# Patient Record
Sex: FEMALE | Race: WHITE | HISPANIC OR LATINO | Employment: FULL TIME | URBAN - METROPOLITAN AREA
[De-identification: names, ages, dates, MRNs, and addresses within clinical notes are randomized per-mention and may not be internally consistent; named-entity substitution may affect disease eponyms.]

---

## 2017-03-30 ENCOUNTER — HOSPITAL ENCOUNTER (EMERGENCY)
Facility: HOSPITAL | Age: 32
Discharge: HOME | End: 2017-03-30
Attending: EMERGENCY MEDICINE
Payer: MEDICAID

## 2017-03-30 VITALS
WEIGHT: 170 LBS | HEIGHT: 59 IN | SYSTOLIC BLOOD PRESSURE: 134 MMHG | DIASTOLIC BLOOD PRESSURE: 78 MMHG | OXYGEN SATURATION: 99 % | TEMPERATURE: 98 F | RESPIRATION RATE: 18 BRPM | HEART RATE: 86 BPM | BODY MASS INDEX: 34.27 KG/M2

## 2017-03-30 DIAGNOSIS — R05.9 COUGH: ICD-10-CM

## 2017-03-30 DIAGNOSIS — J20.9 ACUTE BRONCHITIS, UNSPECIFIED ORGANISM: Primary | ICD-10-CM

## 2017-03-30 LAB
B-HCG UR QL: NEGATIVE
CTP QC/QA: YES
FLUAV AG SPEC QL IA: NEGATIVE
FLUBV AG SPEC QL IA: NEGATIVE
SPECIMEN SOURCE: NORMAL

## 2017-03-30 PROCEDURE — 99284 EMERGENCY DEPT VISIT MOD MDM: CPT | Mod: 25

## 2017-03-30 PROCEDURE — 81025 URINE PREGNANCY TEST: CPT

## 2017-03-30 PROCEDURE — 25000003 PHARM REV CODE 250: Performed by: NURSE PRACTITIONER

## 2017-03-30 PROCEDURE — 87400 INFLUENZA A/B EACH AG IA: CPT

## 2017-03-30 RX ORDER — PROMETHAZINE HYDROCHLORIDE AND CODEINE PHOSPHATE 6.25; 1 MG/5ML; MG/5ML
5 SOLUTION ORAL EVERY 4 HOURS PRN
Qty: 118 ML | Refills: 0 | Status: SHIPPED | OUTPATIENT
Start: 2017-03-30 | End: 2017-04-09

## 2017-03-30 RX ORDER — CETIRIZINE HYDROCHLORIDE 10 MG/1
10 TABLET ORAL DAILY
Qty: 30 TABLET | Refills: 0 | OUTPATIENT
Start: 2017-03-30 | End: 2021-10-29

## 2017-03-30 RX ORDER — IBUPROFEN 600 MG/1
600 TABLET ORAL
Status: COMPLETED | OUTPATIENT
Start: 2017-03-30 | End: 2017-03-30

## 2017-03-30 RX ADMIN — IBUPROFEN 600 MG: 600 TABLET ORAL at 10:03

## 2017-03-30 NOTE — DISCHARGE INSTRUCTIONS
Bronquitis Viral [Bronchitis, Viral, Adult No Abx]    Usted tiene laura bronquitis viral (viral bronchitis). Esta enfermedad es contagiosa emmett los primeros días y se transmite por el aire, por la tos o el estornudo de la persona afectada, o por contacto directo con itzel persona (si shar toca a la persona enferma y después se toca los ojos, la nariz o la boca).  La mayoría de las enfermedades virales mejoran en 10-14 días con reposo y simples gretchen caseros; aunque, en ocasiones, la enfermedad puede durar varias semanas. Los antibióticos (antibiotics) son ineficaces contra los virus, por lo que generalmente no se recetan para esta enfermedad.  Cuidados En [place]:  1. Si los síntomas son severos, descanse en dixon casa emmett los primeros 2 ó 3 días. Cuando reanude abhishek actividades, evite cansarse demasiado.  2. No fume y evite exponerse al humo de los demás.  3. Puede usar acetaminofén (acetaminophen) [Tylenol] o ibuprofeno (ibuprofen) [Motrin o Advil] para controlar la fiebre o el dolor, a menos que le hayan recetado otro calmante (analgésico) [pain medicine]. [NOTA: Si tiene laura enfermedad hepática o renal crónica (chronic liver or kidney disease), o ha tenido alguna vez laura úlcera estomacal (stomach ulcer) o sangrado gastrointestinal (GI bleeding), consulte con dixon médico antes de soy estos medicamentos.] (La aspirina [aspirin] no debe usarse nunca en personas menores de 18 años enfermas con fiebre, ya que puede causar daños graves al hígado.)  4. Es posible que tenga poco apetito, por lo que laura dieta ligera es adecuada. Para evitar la deshidratación, delaney entre 6 y 8 vasos de líquido al día (agua, bebidas deportivas denae cherie Gatorade, jugos de fruta, té, sopa etc.). La abundancia de líquido ayuda a desprender las secreciones de la nariz y los pulmones.  5. Los medicamentos sin receta para el resfriado no acortarán la duración de la enfermedad, jocelyn pueden ser útiles para aliviar la tos (Robitussin DM), el  dolor de garganta (Chloraseptic en comprimidos o spray); la congestión nasal y de los senos paranasales (Actifed o Sudafed). [NOTA: No use descongestionantes si tiene la presión arterial luigi.]  Programe laura VISITA DE CONTROL con dixon médico, o según le indique nuestro personal médico, si no empieza a sentirse mejor emmett la semana próxima.  NOTA: Si usted tiene 65 años o más, o si tiene asma crónica (chronic asthma) o enfermedad pulmonar obstructiva crónica (COPD), recomendamos laura VACUNA NEUMOCÓCICA (PNEUMOCOCCAL VACCINATION) cada laura años y laura VACUNA CONTRA LA GRIPE (INFLUENZA VACCINATION [FLU-SHOT]) cada otoño. Hable con dixon médico sobre esto. Si le crump hecho laura radiografía (X-ray), ésta será evaluada por un especialista. Le informarán de los nuevos hallazgos que puedan afectar la atención médica que necesita.]  Busque Prontamente Atención Médica  si algo de lo siguiente ocurre:  · Fiebre superior a los 100.4°F (38.0°C) emmett más de álvaro días.  · Dificultad para respirar, silbidos o dolor al respirar.  · Tos con expulsión de giles o aumento en la cantidad de esputo de color.  · Debilidad, somnolencia, dolor de mandeep, dolor en la ino o en los oídos, o rigidez en el nabil.  Date Last Reviewed: 9/13/2015  © 2555-2835 [employer], [employer]. [address] [place] [address], [place] [address]. Todos los derechos reservados. Esta información no pretende sustituir la atención médica profesional. Sólo dixon médico puede diagnosticar y tratar un problema de richie.

## 2017-03-30 NOTE — ED NOTES
Pt co cough with nasal congestion that started Sunday, pt denies n/v/d, denies chest pain or sob, pt awake alert in no acute distress, breath sounds all fields clear posterior, abd soft non tender to palpate, denies dysuria or hematuria

## 2017-03-30 NOTE — ED AVS SNAPSHOT
[facility]  [address]  [place] [address]               Sparkle Augustine   3/30/2017 10:23 AM   ED    Descripción:  Female : 1985   Departamento:  [facility]           Solorzano Cuidado fue coordinado por:     Provider Role From To    Howie Hunt MD Attending Provider 17 [phone] --    YAYO Yanez Nurse Practitioner 17 1028 --      Razón de la edu     Cough           Diagnósticos de Esta Visita        Comentarios    Acute bronchitis, unspecified organism    -  Primario     Cough           ED Disposition     ED Disposition Condition Comment    Discharge             Lista de tareas           Información de seguimiento     Realice un seguimiento con:  [facility]    Cómo:  Scottie laura edu lo antes posible    Cuándo:  2017    Especialidad:  Family Medicine    Información de contacto:    [address] [facility] [address]  [facility] [address] [phone]      [facility]     [facility] L[facility]nea de Enfermeras - Asistencia   Enfermeras registradas de [facility] pueden ayudarle a reservar laura edu, proveer educación para la richie, asesoría clínica, y otros servicios de asesoramiento.   Llame para davi servicio gratuito a [phone].             Medicamentos           These medications were administered today        Dose Freq    ibuprofen tablet 600 mg 600 mg ED 1 Time    Sig: Take 1 tablet (600 mg total) by mouth ED 1 Time.    Categoría: Normal    Vía: Oral           Verifique que la siguiente lista de medicamentos es laura representación exacta de los medicamentos que está tomando actualmente. Si no hay ningunos reportados, la lista puede estar en mooney. Si no es correcta, por favor póngase en contacto con solorzano proveedor de atención médica. Lleve esta lista con usted en eligio de emergencia.           Medicamentos Actuales            Información de referencia clínica           Elli  "signos vitales ivon     PS Pulso Temperatura Resp Winona Peso    134/78 (BP Location: Right arm, Patient Position: Sitting) 86 98.3 °F (36.8 °C) 18 4' 11" (1.499 m) 77.1 kg (170 lb)    Ultima menstruación SpO2 BMI ([facility])             03/13/2017 99% 34.34 kg/m2         Alergias     A partir del:  3/30/2017        No Known Allergies      Vacunas     Administradas en la fecha de la visita:  3/30/2017        None      ED Micro, Lab, POCT     Start Ordered       Status Ordering Provider    03/30/17 1039 03/30/17 1038  Influenza antigen Nasal Swab  STAT      Final result     03/30/17 0000 03/30/17 1030  POCT urine pregnancy     Comments:  This order was created through External Result Entry    Completed       ED Imaging Orders     Start Ordered       Status Ordering Provider    03/30/17 1050 03/30/17 1050  X-Ray Chest PA And Lateral  1 time imaging      Final result         Instrucciones a barron de luigi         Bronquitis Viral [Bronchitis, Viral, Adult No Abx]    Usted tiene laura bronquitis viral (viral bronchitis). Esta enfermedad es contagiosa emmett los primeros días y se transmite por el aire, por la tos o el estornudo de la persona afectada, o por contacto directo con itzel persona (si shar toca a la persona enferma y después se toca los ojos, la nariz o la boca).  La mayoría de las enfermedades virales mejoran en 10-14 días con reposo y simples gretchen caseros; aunque, en ocasiones, la enfermedad puede durar varias semanas. Los antibióticos (antibiotics) son ineficaces contra los virus, por lo que generalmente no se recetan para esta enfermedad.  Cuidados En [place]:  1. Si los síntomas son severos, descanse en dixon casa emmett los primeros 2 ó 3 días. Cuando reanude abhishek actividades, evite cansarse demasiado.  2. No fume y evite exponerse al humo de los demás.  3. Puede usar acetaminofén (acetaminophen) [Tylenol] o ibuprofeno (ibuprofen) [Motrin o Advil] para controlar la fiebre o el dolor, a menos que le hayan recetado " otro calmante (analgésico) [pain medicine]. [NOTA: Si tiene laura enfermedad hepática o renal crónica (chronic liver or kidney disease), o ha tenido alguna vez laura úlcera estomacal (stomach ulcer) o sangrado gastrointestinal (GI bleeding), consulte con dixon médico antes de soy estos medicamentos.] (La aspirina [aspirin] no debe usarse nunca en personas menores de 18 años enfermas con fiebre, ya que puede causar daños graves al hígado.)  4. Es posible que tenga poco apetito, por lo que laura dieta ligera es adecuada. Para evitar la deshidratación, delaney entre 6 y 8 vasos de líquido al día (agua, bebidas deportivas denae cherie Gatorade, jugos de fruta, té, sopa etc.). La abundancia de líquido ayuda a desprender las secreciones de la nariz y los pulmones.  5. Los medicamentos sin receta para el resfriado no acortarán la duración de la enfermedad, jocelyn pueden ser útiles para aliviar la tos (Robitussin DM), el dolor de garganta (Chloraseptic en comprimidos o spray); la congestión nasal y de los senos paranasales (Actifed o Sudafed). [NOTA: No use descongestionantes si tiene la presión arterial luigi.]  Programe laura VISITA DE CONTROL con dixon médico, o según le indique nuestro personal médico, si no empieza a sentirse mejor emmett la semana próxima.  NOTA: Si usted tiene 65 años o más, o si tiene asma crónica (chronic asthma) o enfermedad pulmonar obstructiva crónica (COPD), recomendamos laura VACUNA NEUMOCÓCICA (PNEUMOCOCCAL VACCINATION) cada laura años y laura VACUNA CONTRA LA GRIPE (INFLUENZA VACCINATION [FLU-SHOT]) cada otoño. Hable con dixon médico sobre esto. Si le crump hecho laura radiografía (X-ray), ésta será evaluada por un especialista. Le informarán de los nuevos hallazgos que puedan afectar la atención médica que necesita.]  Busque Prontamente Atención Médica  si algo de lo siguiente ocurre:  · Fiebre superior a los 100.4°F (38.0°C) emmett más de álvaro días.  · Dificultad para respirar, silbidos o dolor al respirar.  · Tos con  expulsión de giles o aumento en la cantidad de esputo de color.  · Debilidad, somnolencia, dolor de mandeep, dolor en la ino o en los oídos, o rigidez en el nabil.  Date Last Reviewed: 9/13/2015  © 4498-0464 [employer]. [address] [place] [address]. Todos los derechos reservados. Esta información no pretende sustituir la atención médica profesional. Sólo dixon médico puede diagnosticar y tratar un problema de richie.          Registrarse para [facility]     La activación de dixon cuenta [facility] es tan fácil cherie 1-2-3!    1) Ir a [email or web address], seleccione Registrarse Ahora, meter el código de activación y dixon fecha de nacimiento, y seleccione Próximo.    [id number]  Expires: 5/14/2017 11:21 AM      2) Crear un nombre de usuario y contraseña para usar cuando se visita Irmase[facility] en el futuro y selecciona laura pregunta de seguridad en eligio de que pierda dixon contraseña y seleccione Próximo.    3) Introduzca dixon dirección de correo electrónico y izabel clic en Registrarse!    Información Adicional  Si tiene alguna pregunta, por favor, e-mail [email or web address] o llame al [phone] para hablar con nuestro personal. Recuerde, Esdrasecho no debe ser usada para necesidades urgentes. En eligio de emergencia médica, llame al 911.         [facility] cumple con las leyes federales aplicables de derechos civiles y no discrimina por motivos de thanh, color, origen nacional, edad, discapacidad, o sexo.        Language Assistance Services     ATTENTION: Language assistance services are available, free of charge. Please call [phone].      ATENCIÓN: Si habla español, tiene a dixon disposición servicios gratuitos de asistencia lingüística. Llame al [phone].     CHÚ Ý: N?u b?n nói Ti?ng Vi?t, có các d?ch v? h? tr? ngôn ng? mi?n phí dành cho b?n. G?i s? [phone].                      [facility]  [place] [address]                Sparkle Augustine   3/30/2017 10:23 AM   ED    Description:  Female : 1985   Department:  [facility]           Your Care was Coordinated By:     Provider Role From To    Howie Hunt MD Attending Provider 17 1029 --    YAYO Yanez Nurse Practitioner 17 1028 --      Reason for Visit     Cough           Diagnoses this Visit        Comments    Acute bronchitis, unspecified organism    -  Primary     Cough           ED Disposition     ED Disposition Condition Comment    Discharge             To Do List           Follow-up Information     Follow up with [facility]. Schedule an appointment as soon as possible for a visit in 3 days.    Specialty:  Family Medicine    Contact information:    [address], [address]  [facility] [address] [phone]      [facility]     [facility] Care [facility] -  Assistance  Unless otherwise directed by your provider, please contact [facility], our nurse care line that is available for  assistance.     Registered nurses in the [facility] provide: appointment scheduling, clinical advisement, health education, and other advisory services.  Call: [phone] (toll free)               Medications           These medications were administered today        Dose Freq    ibuprofen tablet 600 mg 600 mg ED 1 Time    Sig: Take 1 tablet (600 mg total) by mouth ED 1 Time.    Class: Normal    Route: Oral           Verify that the below list of medications is an accurate representation of the medications you are currently taking.  If none reported, the list may be blank. If incorrect, please contact your healthcare provider. Carry this list with you in case of emergency.           Current Medications            Clinical Reference Information           Your Vitals Were     BP Pulse Temp Resp Height Weight    134/78 (BP Location: Right arm, Patient Position: Sitting) 86  "98.3 °F (36.8 °C) 18 4' 11" (1.499 m) 77.1 kg (170 lb)    Last Period SpO2 BMI             03/13/2017 99% 34.34 kg/m2         Allergies as of 3/30/2017     No Known Allergies      Immunizations Administered on Date of Encounter - 3/30/2017     None      ED Micro, Lab, POCT     Start Ordered       Status Ordering Provider    03/30/17 1039 03/30/17 1038  Influenza antigen Nasal Swab  STAT      Final result     03/30/17 0000 03/30/17 1030  POCT urine pregnancy     Comments:  This order was created through External Result Entry    Completed       ED Imaging Orders     Start Ordered       Status Ordering Provider    03/30/17 1050 03/30/17 1050  X-Ray Chest PA And Lateral  1 time imaging      Final result         Discharge Instructions         Bronquitis Viral [Bronchitis, Viral, Adult No Abx]    Usted tiene laura bronquitis viral (viral bronchitis). Esta enfermedad es contagiosa emmett los primeros días y se transmite por el aire, por la tos o el estornudo de la persona afectada, o por contacto directo con itzel persona (si shar toca a la persona enferma y después se toca los ojos, la nariz o la boca).  La mayoría de las enfermedades virales mejoran en 10-14 días con reposo y simples gretchen caseros; aunque, en ocasiones, la enfermedad puede durar varias semanas. Los antibióticos (antibiotics) son ineficaces contra los virus, por lo que generalmente no se recetan para esta enfermedad.  Cuidados En [place]:  1. Si los síntomas son severos, descanse en dixon casa emmett los primeros 2 ó 3 días. Cuando reanude abhishek actividades, evite cansarse demasiado.  2. No fume y evite exponerse al humo de los demás.  3. Puede usar acetaminofén (acetaminophen) [Tylenol] o ibuprofeno (ibuprofen) [Motrin o Advil] para controlar la fiebre o el dolor, a menos que le hayan recetado otro calmante (analgésico) [pain medicine]. [NOTA: Si tiene laura enfermedad hepática o renal crónica (chronic liver or kidney disease), o ha tenido alguna vez laura úlcera " estomacal (stomach ulcer) o sangrado gastrointestinal (GI bleeding), consulte con dixon médico antes de soy estos medicamentos.] (La aspirina [aspirin] no debe usarse nunca en personas menores de 18 años enfermas con fiebre, ya que puede causar daños graves al hígado.)  4. Es posible que tenga poco apetito, por lo que laura dieta ligera es adecuada. Para evitar la deshidratación, delaney entre 6 y 8 vasos de líquido al día (agua, bebidas deportivas denae cherie Gatorade, jugos de fruta, té, sopa etc.). La abundancia de líquido ayuda a desprender las secreciones de la nariz y los pulmones.  5. Los medicamentos sin receta para el resfriado no acortarán la duración de la enfermedad, jocelyn pueden ser útiles para aliviar la tos (Robitussin DM), el dolor de garganta (Chloraseptic en comprimidos o spray); la congestión nasal y de los senos paranasales (Actifed o Sudafed). [NOTA: No use descongestionantes si tiene la presión arterial luigi.]  Programe laura VISITA DE CONTROL con dixon médico, o según le indique nuestro personal médico, si no empieza a sentirse mejor emmett la semana próxima.  NOTA: Si usted tiene 65 años o más, o si tiene asma crónica (chronic asthma) o enfermedad pulmonar obstructiva crónica (COPD), recomendamos laura VACUNA NEUMOCÓCICA (PNEUMOCOCCAL VACCINATION) cada laura años y laura VACUNA CONTRA LA GRIPE (INFLUENZA VACCINATION [FLU-SHOT]) cada otoño. Hable con dixon médico sobre esto. Si le crump hecho laura radiografía (X-ray), ésta será evaluada por un especialista. Le informarán de los nuevos hallazgos que puedan afectar la atención médica que necesita.]  Busque Prontamente Atención Médica  si algo de lo siguiente ocurre:  · Fiebre superior a los 100.4°F (38.0°C) emmett más de álvaro días.  · Dificultad para respirar, silbidos o dolor al respirar.  · Tos con expulsión de giles o aumento en la cantidad de esputo de color.  · Debilidad, somnolencia, dolor de mandeep, dolor en la ino o en los oídos, o rigidez en el  nabil.  Date Last Reviewed: 9/13/2015  © 3716-7741 [employer]. [address] [place] [address], [place] [address]. Todos los derechos reservados. Esta información no pretende sustituir la atención médica profesional. Sólo dixon médico puede diagnosticar y tratar un problema de richie.          [facility] Sign-Up     Activating your [facility] account is as easy as 1-2-3!     1) Visit [email or web address], select Sign Up Now, enter this activation code and your date of birth, then select Next.  [email or web address]  Expires: 5/14/2017 11:21 AM      2) Create a username and password to use when you visit [facility] in the future and select a security question in case you lose your password and select Next.    3) Enter your e-mail address and click Sign Up!    Additional Information  If you have questions, please e-mail [email or web address] or call [phone] to talk to our [facility] staff. Remember, [facility] is NOT to be used for urgent needs. For medical emergencies, dial 911.          [facility] complies with applicable Federal civil rights laws and does not discriminate on the basis of race, color, national origin, age, disability, or sex.        Language Assistance Services     ATTENTION: Language assistance services are available, free of charge. Please call [phone].      ATENCIÓN: Si habla español, tiene a dixon disposición servicios gratuitos de asistencia lingüística. Llame al [phone].     CHÚ Ý: N?u b?n nói Ti?ng Vi?t, có các d?ch v? h? tr? ngôn ng? mi?n phí dành cho b?n. G?i s? [phone].

## 2017-03-30 NOTE — ED PROVIDER NOTES
"Encounter Date: 3/30/2017       History     Chief Complaint   Patient presents with    Cough     cough, congestion, fever since sunday     Review of patient's allergies indicates:  No Known Allergies  HPI Comments: 30yo female with reported history of bronchitis and pneumonia is here for cough, nasal congestion, and subjective fever since Sunday.  She is not a smoker, no history of breathing problems.  Pt has been taking mucinex to help with her symptoms without relief.  She states this morning her temperature was 100.4.  No tylenol or motrin today.  Denies headache, recent antibiotic use, abd pain, n/v/d, dysuria, SOB, and CP.  No hemoptysis or palpitations.  No history of DVT.  Pt reports her son recently had "a cold" but now he is feeling better.     Patient is a 31 y.o. female presenting with the following complaint: cough. The history is provided by the patient.   Cough   This is a new problem. The current episode started several days ago. The problem occurs every few minutes. The problem has been unchanged. The cough is non-productive. The maximum temperature recorded prior to her arrival was 100 - 100.9 F. Associated symptoms include chills, sweats, rhinorrhea and sore throat. Pertinent negatives include no chest pain, no ear congestion, no ear pain, no headaches, no myalgias, no shortness of breath and no wheezing. Treatments tried: Mucinex. The treatment provided no relief. She is not a smoker. Her past medical history is significant for bronchitis and pneumonia. Her past medical history does not include COPD or asthma.     History reviewed. No pertinent past medical history.  History reviewed. No pertinent surgical history.  History reviewed. No pertinent family history.  Social History   Substance Use Topics    Smoking status: Never Smoker    Smokeless tobacco: None    Alcohol use No     Review of Systems   Constitutional: Positive for chills and fever. Negative for activity change, appetite change " and fatigue.   HENT: Positive for congestion, rhinorrhea and sore throat. Negative for drooling, ear pain, postnasal drip, trouble swallowing and voice change.    Respiratory: Positive for cough. Negative for shortness of breath and wheezing.    Cardiovascular: Negative for chest pain.   Gastrointestinal: Negative for abdominal pain, diarrhea, nausea and vomiting.   Genitourinary: Negative for difficulty urinating, dysuria, flank pain, menstrual problem, pelvic pain, urgency, vaginal bleeding, vaginal discharge and vaginal pain.   Musculoskeletal: Negative for myalgias.   Skin: Negative for rash.   Allergic/Immunologic: Negative for immunocompromised state.   Neurological: Negative for weakness and headaches.   Psychiatric/Behavioral: Negative for confusion.   All other systems reviewed and are negative.      Physical Exam   Initial Vitals   BP Pulse Resp Temp SpO2   03/30/17 1006 03/30/17 1006 03/30/17 1006 03/30/17 1006 03/30/17 1006   134/78 86 18 98.3 °F (36.8 °C) 99 %     Physical Exam    Nursing note and vitals reviewed.  Constitutional: Vital signs are normal. She appears well-developed and well-nourished. She is active and cooperative. She is easily aroused.  Non-toxic appearance. She does not have a sickly appearance. She does not appear ill. No distress.   HENT:   Head: Normocephalic and atraumatic.   Right Ear: Hearing, external ear and ear canal normal. Tympanic membrane is not injected, not erythematous, not retracted and not bulging. Tympanic membrane mobility is normal. A middle ear effusion is present.   Left Ear: Hearing, external ear and ear canal normal. Tympanic membrane is not injected, not erythematous, not retracted and not bulging. Tympanic membrane mobility is normal. A middle ear effusion is present.   Nose: Mucosal edema and rhinorrhea present. No sinus tenderness. Right sinus exhibits no maxillary sinus tenderness and no frontal sinus tenderness. Left sinus exhibits no maxillary sinus  tenderness and no frontal sinus tenderness.   Mouth/Throat: Uvula is midline and mucous membranes are normal. No oropharyngeal exudate, posterior oropharyngeal edema, posterior oropharyngeal erythema or tonsillar abscesses.   Oropharyngeal erythema.     Eyes: Conjunctivae and EOM are normal.   Neck: Normal range of motion. Neck supple.   Cardiovascular: Normal rate, regular rhythm and normal heart sounds.   Pulmonary/Chest: Effort normal and breath sounds normal.   Abdominal: Soft. Normal appearance and bowel sounds are normal. She exhibits no distension. There is no tenderness. There is no rigidity, no rebound, no guarding and no CVA tenderness.   Neurological: She is alert, oriented to person, place, and time and easily aroused. She has normal strength. GCS eye subscore is 4. GCS verbal subscore is 5. GCS motor subscore is 6.   Skin: Skin is warm, dry and intact. No bruising and no rash noted. No erythema.   Psychiatric: She has a normal mood and affect. Her speech is normal and behavior is normal. Judgment and thought content normal. Cognition and memory are normal.         ED Course   Procedures  Labs Reviewed   INFLUENZA A AND B ANTIGEN     Imaging Results         X-Ray Chest PA And Lateral (Final result) Result time:  03/30/17 11:15:32    Final result by Marcela Milton MD (03/30/17 11:15:32)    Impression:      No abnormality seen.      Electronically signed by: MARCELA MILTON MD  Date:     03/30/17  Time:    11:15     Narrative:    PA AND LATERAL CHEST    Comparison: None    Results: PA and lateral views of the chest.The lung volume appears adequate.  No parenchymal or pleural abnormality seen.  The cardiac silhouette and pulmonary vascularity appear within normal limits.  No evidence of hilar lymph node enlargement.  No accumulation of pleural fluid or pneumothorax.  The osseous structures appear within normal limits.                      Medical Decision Making:   Initial Assessment:   30yo  female here for nasal congestion, cough, and subjective fever.  Pt appears well, nontoxic.  Vitals stable.  Nose rhinorrhea.  Throat mild erythema with post-nasal drip.  HR RRR, lungs CTA and equal.  She is tolerating PO without difficulty.    Differential Diagnosis:   URI, influenza, pneumonia, bronchitis, allergic rhinitis  Clinical Tests:   Lab Tests: Ordered and Reviewed  The following lab test(s) were unremarkable: UPT  Radiological Study: Ordered and Reviewed  ED Management:  CXR, Influenza, UPT  CXR negative for acute changes.  Influenza negative.  I Feel the pt's symptoms are due to acute bronchitis.  No indication for abx at this time.  RX Zyrtec and Phenergan with Codeine cough syrup.  Advised continuing Mucinex, increased fluid intake, and f/u with PCP within 3 days.  Return to the ED if condition changes, progresses, or if there are concerns.  Pt verbalized understanding and compliance.               Attending Attestation:     Physician Attestation Statement for NP/PA:   I discussed this assessment and plan of this patient with the NP/PA, but I did not personally examine the patient. The face to face encounter was performed by the NP/PA.                  ED Course     Clinical Impression:   The primary encounter diagnosis was Acute bronchitis, unspecified organism. A diagnosis of Cough was also pertinent to this visit.          YAYO Yanez  03/30/17 1512       Howie Hunt MD  03/30/17 153

## 2017-04-27 ENCOUNTER — HOSPITAL ENCOUNTER (EMERGENCY)
Facility: HOSPITAL | Age: 32
Discharge: HOME | End: 2017-04-27
Attending: EMERGENCY MEDICINE
Payer: MEDICAID

## 2017-04-27 VITALS
WEIGHT: 166 LBS | TEMPERATURE: 98 F | BODY MASS INDEX: 33.47 KG/M2 | HEIGHT: 59 IN | DIASTOLIC BLOOD PRESSURE: 67 MMHG | SYSTOLIC BLOOD PRESSURE: 110 MMHG | RESPIRATION RATE: 20 BRPM | HEART RATE: 75 BPM | OXYGEN SATURATION: 98 %

## 2017-04-27 DIAGNOSIS — G43.909 MIGRAINE WITHOUT STATUS MIGRAINOSUS, NOT INTRACTABLE, UNSPECIFIED MIGRAINE TYPE: Primary | ICD-10-CM

## 2017-04-27 LAB
B-HCG UR QL: NEGATIVE
CTP QC/QA: YES

## 2017-04-27 PROCEDURE — 99284 EMERGENCY DEPT VISIT MOD MDM: CPT | Mod: 25

## 2017-04-27 PROCEDURE — 96361 HYDRATE IV INFUSION ADD-ON: CPT

## 2017-04-27 PROCEDURE — 96375 TX/PRO/DX INJ NEW DRUG ADDON: CPT

## 2017-04-27 PROCEDURE — 96372 THER/PROPH/DIAG INJ SC/IM: CPT

## 2017-04-27 PROCEDURE — 25000003 PHARM REV CODE 250: Performed by: PHYSICIAN ASSISTANT

## 2017-04-27 PROCEDURE — 63600175 PHARM REV CODE 636 W HCPCS: Performed by: PHYSICIAN ASSISTANT

## 2017-04-27 PROCEDURE — 96374 THER/PROPH/DIAG INJ IV PUSH: CPT

## 2017-04-27 RX ORDER — SUMATRIPTAN 6 MG/.5ML
6 INJECTION, SOLUTION SUBCUTANEOUS ONCE
Status: COMPLETED | OUTPATIENT
Start: 2017-04-27 | End: 2017-04-27

## 2017-04-27 RX ORDER — SODIUM CHLORIDE 9 MG/ML
1000 INJECTION, SOLUTION INTRAVENOUS
Status: COMPLETED | OUTPATIENT
Start: 2017-04-27 | End: 2017-04-27

## 2017-04-27 RX ORDER — ONDANSETRON 4 MG/1
4 TABLET, ORALLY DISINTEGRATING ORAL EVERY 6 HOURS PRN
Qty: 15 TABLET | Refills: 0 | OUTPATIENT
Start: 2017-04-27 | End: 2023-02-13

## 2017-04-27 RX ORDER — DIPHENHYDRAMINE HYDROCHLORIDE 50 MG/ML
25 INJECTION INTRAMUSCULAR; INTRAVENOUS
Status: COMPLETED | OUTPATIENT
Start: 2017-04-27 | End: 2017-04-27

## 2017-04-27 RX ORDER — BUTALBITAL, ACETAMINOPHEN AND CAFFEINE 50; 325; 40 MG/1; MG/1; MG/1
1 TABLET ORAL EVERY 4 HOURS PRN
Qty: 15 TABLET | Refills: 0 | Status: SHIPPED | OUTPATIENT
Start: 2017-04-27 | End: 2017-05-27

## 2017-04-27 RX ORDER — KETOROLAC TROMETHAMINE 30 MG/ML
30 INJECTION, SOLUTION INTRAMUSCULAR; INTRAVENOUS
Status: COMPLETED | OUTPATIENT
Start: 2017-04-27 | End: 2017-04-27

## 2017-04-27 RX ORDER — METOCLOPRAMIDE HYDROCHLORIDE 5 MG/ML
10 INJECTION INTRAMUSCULAR; INTRAVENOUS
Status: COMPLETED | OUTPATIENT
Start: 2017-04-27 | End: 2017-04-27

## 2017-04-27 RX ADMIN — DIPHENHYDRAMINE HYDROCHLORIDE 25 MG: 50 INJECTION, SOLUTION INTRAMUSCULAR; INTRAVENOUS at 05:04

## 2017-04-27 RX ADMIN — KETOROLAC TROMETHAMINE 30 MG: 30 INJECTION, SOLUTION INTRAMUSCULAR at 05:04

## 2017-04-27 RX ADMIN — METOCLOPRAMIDE 10 MG: 5 INJECTION, SOLUTION INTRAMUSCULAR; INTRAVENOUS at 05:04

## 2017-04-27 RX ADMIN — SUMATRIPTAN 6 MG: 6 INJECTION, SOLUTION SUBCUTANEOUS at 07:04

## 2017-04-27 RX ADMIN — SODIUM CHLORIDE 1000 ML: 0.9 INJECTION, SOLUTION INTRAVENOUS at 05:04

## 2017-04-27 NOTE — ED NOTES
Pt here with headache with nausea and vomiting; pt with history of migraines. Allergies reviewed. Skin pale  But warm to touch and dry. Pt is oriented with clear speech. No edema present. Iv started, ivf infusing, meds given.

## 2017-04-27 NOTE — ED AVS SNAPSHOT
[facility]  [address] [facility] [address]               Sparkle Chu   2017  4:48 PM   ED    Description:  Female : 1985   Department:  [facility]           Your Care was Coordinated By:     Provider Role From To    Denisse Altman MD Attending Provider 17 [phone] --    Vanessa Burleson PA-C Physician Assistant 17 [phone] --      Reason for Visit     Migraine           Diagnoses this Visit        Comments    Migraine without status migrainosus, not intractable, unspecified migraine type    -  Primary       ED Disposition     None           To Do List           Follow-up Information     Follow up with [facility] In 3 days.    Specialty:  Family Medicine    Contact information:    [address], [address]  [facility] [address] [phone]       These Medications        Disp Refills Start End    butalbital-acetaminophen-caffeine -40 mg (FIORICET, ESGIC) -40 mg per tablet 15 tablet 0 2017    Take 1 tablet by mouth every 4 (four) hours as needed. - Oral    ondansetron (ZOFRAN-ODT) 4 MG TbDL 15 tablet 0 2017     Take 1 tablet (4 mg total) by mouth every 6 (six) hours as needed. - Oral      [facility]     [facility] Nurse Care Line - 24/7 Assistance  Unless otherwise directed by your provider, please contact [facility], our nurse care line that is available for 24/ assistance.     Registered nurses in the [facility] provide: appointment scheduling, clinical advisement, health education, and other advisory services.  Call: [phone] (toll free)               Medications           Message regarding Medications     Verify the changes and/or additions to your medication regime listed below are the same as discussed with your clinician today.  If any of these changes or additions are incorrect, please notify your healthcare  provider.        START taking these NEW medications        Refills    butalbital-acetaminophen-caffeine -40 mg (FIORICET, ESGIC) -40 mg per tablet 0    Sig: Take 1 tablet by mouth every 4 (four) hours as needed.    Class: Print    Route: Oral    ondansetron (ZOFRAN-ODT) 4 MG TbDL 0    Sig: Take 1 tablet (4 mg total) by mouth every 6 (six) hours as needed.    Class: Print    Route: Oral      These medications were administered today        Dose Freq    ketorolac injection 30 mg 30 mg ED 1 Time    Sig: Inject 30 mg into the muscle ED 1 Time.    Class: Normal    Route: Intramuscular    Cosign for Ordering: Required by Denisse Altman MD    diphenhydrAMINE injection 25 mg 25 mg ED 1 Time    Sig: Inject 0.5 mLs (25 mg total) into the vein ED 1 Time.    Class: Normal    Route: Intravenous    Cosign for Ordering: Required by Denisse Altman MD    metoclopramide HCl injection 10 mg 10 mg ED 1 Time    Sig: Inject 2 mLs (10 mg total) into the vein ED 1 Time.    Class: Normal    Route: Intravenous    Cosign for Ordering: Required by Dneisse Altman MD    0.9%  NaCl infusion 1,000 mL ED 1 Time    Sig: Inject 1,000 mLs into the vein ED 1 Time.    Class: Normal    Route: Intravenous    Cosign for Ordering: Required by Denisse Altman MD    sumatriptan succinate injection 6 mg 6 mg Once    Sig: Inject 0.5 mLs (6 mg total) into the skin once.    Class: Normal    Route: Subcutaneous    Cosign for Ordering: Required by Denisse Altman MD           Verify that the below list of medications is an accurate representation of the medications you are currently taking.  If none reported, the list may be blank. If incorrect, please contact your healthcare provider. Carry this list with you in case of emergency.           Current Medications     butalbital-acetaminophen-caffeine -40 mg (FIORICET, ESGIC) -40 mg per tablet Take 1 tablet by mouth every 4 (four) hours as needed.    ondansetron (ZOFRAN-ODT) 4 MG TbDL Take 1 tablet (4  "mg total) by mouth every 6 (six) hours as needed.    pramoxine-mineral oil-zinc 1-12.5% (TUCKS) 1-46.6-12.5 % Oint Place 1 suppository rectally every 6 (six) hours as needed.           Clinical Reference Information           Your Vitals Were     BP Pulse Temp Height Weight Last Period    103/57 (BP Location: Left arm, Patient Position: Sitting, BP Method: Automatic) 71 98.1 °F (36.7 °C) (Oral) 4' 11" (1.499 m) 75.3 kg (166 lb) 04/21/2017    SpO2 BMI             98% 33.53 kg/m2         Allergies as of 4/27/2017        Reactions    Flexeril [Cyclobenzaprine] Anxiety    Tramadol Anxiety      Immunizations Administered on Date of Encounter - 4/27/2017     None      ED Micro, Lab, POCT     Start Ordered       Status Ordering Provider    04/27/17 0000 04/27/17 1659  POCT urine pregnancy     Comments:  This order was created through External Result Entry    Completed       ED Imaging Orders     None        Discharge Instructions         Migraine Headache: Stages and Treatment  A migraine headache tends to progress in stages. Learning these stages can help you better understand what is happening. Then you can learn ways to reduce pain and relieve other symptoms. Methods for relieving your symptoms include self-care and medicines.  Migraine stages  Migraines tend to progress through 4 stages. Many people don't have all stages, and stages may differ with each headache:  · Prodrome. A few hours to a day or so before the headache, you may feel tired, (yawning many times), uneasy, or blanc. You may also feel bloated or crave certain foods.  · Aura. Up to an hour before the headache starts, some migraine sufferers experience aura--flashing lights, blind spots, other vision problems, confusion, difficulty speaking, or other neurologic symptoms.  · Headache. Moderate to severe pain affects one side of the head and then can spread to both sides, often along with nausea. You may be highly sensitive to light, sound, and odors. " "Vomiting or diarrhea may also happen. This stage lasts 4 to 72 hours.  · Postdrome. After your headache ends, you may feel tired, achy, and "washed out." This may last for a day or so.    Self-care during a migraine  Here is what you can do:  · Use a cold compress. Wrap a thin cloth around a cold pack, a cold can of soda, or a bag of frozen vegetables. Apply this to your temple or other pain site.  · Drink fluids. If nausea makes it hard to drink, try sucking on ice.  · Rest. If possible, lie down. Try not to bend over, as this may increase your pain. Sometimes laying in a dark quiet room can help the migraine from being aggravated.    · Try caffeine. Some people find that drinking fluids with caffeine, such as coffee or tea, helps to lessen migraine pain.  Using medicines  Work with your healthcare provider to find the right medicines for you. Medicines for migraine may relieve pain (analgesics), relieve nausea, or attack the migraine's root causes (migraine-specific medicines).  Rebound headache  Taking analgesics each day, or even several times a week, may lead to more frequent and severe headaches. These are called rebound headaches. If you think you're having rebound headaches, tell your healthcare provider. He or she can help you safely decrease your medicine. Rebound caffeine withdrawal headaches can also happen.    Date Last Reviewed: 10/9/2015  © 4541-7147 [employer]. [address] [place] [address]. All rights reserved. This information is not intended as a substitute for professional medical care. Always follow your healthcare professional's instructions.          [facility] Sign-Up     Activating your [facility] account is as easy as 1-2-3!     1) Visit [email or web address], select Sign Up Now, enter this activation code and your date of birth, then select Next.  [email or web address]  Expires: 6/11/2017  8:08 PM      2) Create a username and password to use when you visit [facility] in the " future and select a security question in case you lose your password and select Next.    3) Enter your e-mail address and click Sign Up!    Additional Information  If you have questions, please e-mail [email or web address] or call [phone] to talk to our [facility] staff. Remember, [facility] is NOT to be used for urgent needs. For medical emergencies, dial 911.          [facility] complies with applicable Federal civil rights laws and does not discriminate on the basis of race, color, national origin, age, disability, or sex.        Language Assistance Services     ATTENTION: Language assistance services are available, free of charge. Please call [phone].      ATENCIÓN: Si habla susan, tiene a dixon disposición servicios gratuitos de asistencia lingüística. Llame al [phone].     CHÚ Ý: N?u b?n nói Ti?ng Vi?t, có các d?ch v? h? tr? ngôn ng? mi?n phí dành cho b?n. G?i s? [phone].

## 2017-04-27 NOTE — ED PROVIDER NOTES
Encounter Date: 4/27/2017       History     Chief Complaint   Patient presents with    Migraine     Since Monday Pt has been having Migraines that has not been relieved with OTC meds. Positive dizzy and vision changes.      Review of patient's allergies indicates:   Allergen Reactions    Flexeril [cyclobenzaprine] Anxiety    Tramadol Anxiety     HPI Comments: Sparkle Chu, a 31 y.o. female that presents to the ED for headache that started on Monday.  She states that she started having migraine headaches in September.  She was seen by her PCP in October and given a prescription for topamax. She states she has taken Excedrin migraine, tylenol and Topamax with no relief of symptoms.  She's had one episode of vomiting today as well as intermittent decreased vision.  She denies any recent head trauma.        Patient is a 31 y.o. female presenting with the following complaint: headaches. The history is provided by the patient.   Headache    This is a new problem. The current episode started in the past 7 days. The problem occurs constantly. The problem has been gradually worsening. The pain is located in the frontal region. The pain does not radiate. The pain quality is similar to prior headaches. The quality of the pain is described as aching. The pain is at a severity of 10/10. Associated symptoms include dizziness, nausea, phonophobia, photophobia and vomiting. Pertinent negatives include no abdominal pain, abnormal behavior, blurred vision, fever, loss of balance or neck pain. The symptoms are aggravated by bright light and noise. She has tried acetaminophen, Excedrin, NSAIDs and triptans for the symptoms. The treatment provided no relief. Her past medical history is significant for migraine headaches. There is no history of hypertension, migraines in the family, obesity or recent head traumas.     Past Medical History:   Diagnosis Date    Asthma     Bronchitis     Hemorrhoid     Sciatica      Past  Surgical History:   Procedure Laterality Date     SECTION       History reviewed. No pertinent family history.  Social History   Substance Use Topics    Smoking status: Never Smoker    Smokeless tobacco: Never Used    Alcohol use No     Review of Systems   Constitutional: Negative for fever.   Eyes: Positive for photophobia. Negative for blurred vision.   Gastrointestinal: Positive for nausea and vomiting. Negative for abdominal pain, constipation and diarrhea.   Musculoskeletal: Negative for neck pain and neck stiffness.   Skin: Negative for color change and rash.   Allergic/Immunologic: Negative for immunocompromised state.   Neurological: Positive for dizziness and headaches. Negative for speech difficulty and loss of balance.   Psychiatric/Behavioral: Negative for agitation and confusion.   All other systems reviewed and are negative.      Physical Exam   Initial Vitals   BP Pulse Resp Temp SpO2   17 1643 17 1643 -- 17 1643 17 1643   110/77 75  98.1 °F (36.7 °C) 100 %     Physical Exam    Nursing note and vitals reviewed.  Constitutional: Vital signs are normal. She appears well-developed and well-nourished. No distress.   HENT:   Head: Normocephalic and atraumatic.   Right Ear: Hearing, tympanic membrane, external ear and ear canal normal.   Left Ear: Hearing, tympanic membrane, external ear and ear canal normal.   Nose: Nose normal.   Mouth/Throat: Uvula is midline and oropharynx is clear and moist.   Eyes: Conjunctivae and EOM are normal.   Neck: Normal range of motion. No tracheal deviation present.   Cardiovascular: Normal rate and regular rhythm.   Pulmonary/Chest: Breath sounds normal. No respiratory distress. She has no wheezes. She has no rhonchi. She has no rales.   Abdominal: Soft. Bowel sounds are normal. She exhibits no distension. There is no tenderness. There is no rebound and no guarding.   Musculoskeletal: Normal range of motion. She exhibits no tenderness.  "  Lymphadenopathy:     She has no cervical adenopathy.   Neurological: She is alert and oriented to person, place, and time. She has normal strength. No cranial nerve deficit. Coordination and gait normal. GCS eye subscore is 4. GCS verbal subscore is 5. GCS motor subscore is 6.   Skin: Skin is warm and dry. No rash noted. No erythema.   Psychiatric: She has a normal mood and affect. Thought content normal.         ED Course   Procedures  Labs Reviewed - No data to display          Medical Decision Making:   Initial Assessment:   Headache, N/V  Differential Diagnosis:   Tension headache, pseudotumor cerebri, temporal arteritis, ICH/SAH, Intracranial mass, migraine, meningitis, cluster headache, sinus headache  ED Management:  Feel the patient's headache is benign.  The headache improved with toradol, reglan, benadryl and Imitrex.  No neck stiffness, vision changes, fever, rash, meningismus/neck stiffness to suggest pseudotumor cerebri or meningitis.  No pain over temporal arteries or vision changes/loss to suggest temporal arteritis.  No "thunderclap onset" or neck stiffness to suggest spontaneous SAH/ICH.  No lancinated pain to eyes with tearing to suggest cluster headache.  No sinus pressure or nasal congestion to suggest sinus headache.  Patient's headache is not in a "band like" distrubution to suggest tension headache.                Attending Attestation:     Physician Attestation Statement for NP/PA:   I discussed this assessment and plan of this patient with the NP/PA, but I did not personally examine the patient. The face to face encounter was performed by the NP/PA.                  ED Course     Clinical Impression:   The encounter diagnosis was Migraine without status migrainosus, not intractable, unspecified migraine type.          Vanessa Burleson PA-C  04/27/17 7137       Denisse Altman MD  04/28/17 7595    "

## 2017-04-28 NOTE — DISCHARGE INSTRUCTIONS
"  Migraine Headache: Stages and Treatment  A migraine headache tends to progress in stages. Learning these stages can help you better understand what is happening. Then you can learn ways to reduce pain and relieve other symptoms. Methods for relieving your symptoms include self-care and medicines.  Migraine stages  Migraines tend to progress through 4 stages. Many people don't have all stages, and stages may differ with each headache:  · Prodrome. A few hours to a day or so before the headache, you may feel tired, (yawning many times), uneasy, or blanc. You may also feel bloated or crave certain foods.  · Aura. Up to an hour before the headache starts, some migraine sufferers experience aura--flashing lights, blind spots, other vision problems, confusion, difficulty speaking, or other neurologic symptoms.  · Headache. Moderate to severe pain affects one side of the head and then can spread to both sides, often along with nausea. You may be highly sensitive to light, sound, and odors. Vomiting or diarrhea may also happen. This stage lasts 4 to 72 hours.  · Postdrome. After your headache ends, you may feel tired, achy, and "washed out." This may last for a day or so.    Self-care during a migraine  Here is what you can do:  · Use a cold compress. Wrap a thin cloth around a cold pack, a cold can of soda, or a bag of frozen vegetables. Apply this to your temple or other pain site.  · Drink fluids. If nausea makes it hard to drink, try sucking on ice.  · Rest. If possible, lie down. Try not to bend over, as this may increase your pain. Sometimes laying in a dark quiet room can help the migraine from being aggravated.    · Try caffeine. Some people find that drinking fluids with caffeine, such as coffee or tea, helps to lessen migraine pain.  Using medicines  Work with your healthcare provider to find the right medicines for you. Medicines for migraine may relieve pain (analgesics), relieve nausea, or attack the " migraine's root causes (migraine-specific medicines).  Rebound headache  Taking analgesics each day, or even several times a week, may lead to more frequent and severe headaches. These are called rebound headaches. If you think you're having rebound headaches, tell your healthcare provider. He or she can help you safely decrease your medicine. Rebound caffeine withdrawal headaches can also happen.    Date Last Reviewed: 10/9/2015  © 3159-0479 [employer]. [address] [place] [address], [place] [address]. All rights reserved. This information is not intended as a substitute for professional medical care. Always follow your healthcare professional's instructions.

## 2017-10-09 ENCOUNTER — HOSPITAL ENCOUNTER (EMERGENCY)
Facility: HOSPITAL | Age: 32
Discharge: HOME | End: 2017-10-09
Attending: EMERGENCY MEDICINE
Payer: MEDICAID

## 2017-10-09 VITALS
TEMPERATURE: 98 F | HEART RATE: 75 BPM | RESPIRATION RATE: 18 BRPM | SYSTOLIC BLOOD PRESSURE: 122 MMHG | OXYGEN SATURATION: 99 % | BODY MASS INDEX: 34.27 KG/M2 | WEIGHT: 170 LBS | DIASTOLIC BLOOD PRESSURE: 78 MMHG | HEIGHT: 59 IN

## 2017-10-09 DIAGNOSIS — R05.9 COUGH: ICD-10-CM

## 2017-10-09 DIAGNOSIS — J40 BRONCHITIS: Primary | ICD-10-CM

## 2017-10-09 PROCEDURE — 87400 INFLUENZA A/B EACH AG IA: CPT | Mod: 59

## 2017-10-09 PROCEDURE — 81025 URINE PREGNANCY TEST: CPT | Performed by: EMERGENCY MEDICINE

## 2017-10-09 PROCEDURE — 99284 EMERGENCY DEPT VISIT MOD MDM: CPT

## 2017-10-09 RX ORDER — PROMETHAZINE HYDROCHLORIDE AND CODEINE PHOSPHATE 6.25; 1 MG/5ML; MG/5ML
5 SOLUTION ORAL EVERY 4 HOURS PRN
Qty: 100 ML | Refills: 0 | Status: SHIPPED | OUTPATIENT
Start: 2017-10-09 | End: 2017-10-19

## 2017-10-09 NOTE — ED NOTES
"Pt states that she feels like she "has pneumonia again" Pt with a non-productive cough. rr even and unlabored on ra. Pt denies cp/sob at this time. Mother at bedside. Will continue to monitor.    "

## 2017-10-09 NOTE — ED PROVIDER NOTES
Encounter Date: 10/9/2017       History     Chief Complaint   Patient presents with    Cough     c/o frequent cough with green sputum, body aches, nasal and chest congestion for the past few days     Patient is a 32-year-old female who complains of a cough productive for green colored sputum.  She also has severe congestion and body aches.  She has had subjective fever with chills.  No nausea or vomiting.  She has occasional shortness of breath and discomfort to the left side of her chest.  Patient's concerned because she has had pneumonia in the past and feels the symptoms are similar.      The history is provided by the patient.     Review of patient's allergies indicates:   Allergen Reactions    Flexeril [cyclobenzaprine] Anxiety    Tramadol Anxiety     Past Medical History:   Diagnosis Date    Asthma     Bronchitis     Hemorrhoid     Sciatica      Past Surgical History:   Procedure Laterality Date     SECTION       History reviewed. No pertinent family history.  Social History   Substance Use Topics    Smoking status: Never Smoker    Smokeless tobacco: Never Used    Alcohol use No     Review of Systems   HENT: Negative for sore throat and trouble swallowing.    Respiratory: Positive for cough and shortness of breath.    Gastrointestinal: Negative for abdominal pain, nausea and vomiting.   Genitourinary: Negative for dysuria.   Musculoskeletal: Positive for myalgias.   Neurological: Negative for dizziness.   All other systems reviewed and are negative.      Physical Exam     Initial Vitals [10/09/17 0858]   BP Pulse Resp Temp SpO2   130/84 75 18 97.8 °F (36.6 °C) 99 %      MAP       99.33         Physical Exam    Nursing note and vitals reviewed.  Constitutional: She appears well-developed and well-nourished.   HENT:   Head: Normocephalic and atraumatic.   Eyes: Conjunctivae and EOM are normal. Pupils are equal, round, and reactive to light.   Neck: Normal range of motion. Neck supple.    Cardiovascular: Normal rate and regular rhythm.   Pulmonary/Chest: Breath sounds normal.   Abdominal: Soft. There is no tenderness. There is no rebound and no guarding.   Musculoskeletal: Normal range of motion.   Neurological: She is alert and oriented to person, place, and time. She has normal strength.   Skin: Skin is warm and dry.   Psychiatric: She has a normal mood and affect.         ED Course   Procedures  Labs Reviewed   INFLUENZA A AND B ANTIGEN   POCT URINE PREGNANCY        Imaging Results          X-Ray Chest PA And Lateral (Final result)  Result time 10/09/17 10:00:02    Final result by Sagrario Fonseca MD (10/09/17 10:00:02)                 Impression:     No evidence acute cardiac or pulmonary disease.      Electronically signed by: SAGRARIO FONSECA MD  Date:     10/09/17  Time:    10:00              Narrative:    Views: PA and lateral    There is no pneumothorax, pleural effusion or focal consolidation.  The cardiac silhouette is within normal limits. The trachea is midline.  The osseous structures are unremarkable.                              X-Rays:   Independently Interpreted Readings:   Chest X-Ray: No infiltrates.  No acute abnormalities.     Medical Decision Making:   Clinical Tests:   Lab Tests: Reviewed and Ordered       <> Summary of Lab: Influenza : Negative.  ED Management:  32-year-old female with bronchitis.  Chest x-ray shows no infiltrates.  Influenza is negative.  She'll be discharged with a prescription for Phenergan with codeine, as her cough is keeping her awake at night.  I see no need for antibiotics.  She'll follow-up with her primary physician when able or return here for any worsening of her condition.                   ED Course      Clinical Impression:   The primary encounter diagnosis was Bronchitis. A diagnosis of Cough was also pertinent to this visit.                           Howie Hunt MD  10/09/17 4662

## 2021-10-29 ENCOUNTER — HOSPITAL ENCOUNTER (EMERGENCY)
Facility: HOSPITAL | Age: 36
Discharge: HOME | End: 2021-10-29
Attending: EMERGENCY MEDICINE

## 2021-10-29 VITALS
OXYGEN SATURATION: 99 % | SYSTOLIC BLOOD PRESSURE: 136 MMHG | HEART RATE: 98 BPM | WEIGHT: 180 LBS | DIASTOLIC BLOOD PRESSURE: 86 MMHG | HEIGHT: 59 IN | RESPIRATION RATE: 20 BRPM | TEMPERATURE: 98 F | BODY MASS INDEX: 36.29 KG/M2

## 2021-10-29 DIAGNOSIS — Z20.822 COVID-19 VIRUS NOT DETECTED: ICD-10-CM

## 2021-10-29 DIAGNOSIS — J06.9 VIRAL URI: Primary | ICD-10-CM

## 2021-10-29 DIAGNOSIS — R05.9 COUGH: ICD-10-CM

## 2021-10-29 LAB
CTP QC/QA: YES
CTP QC/QA: YES
POC MOLECULAR INFLUENZA A AGN: NEGATIVE
POC MOLECULAR INFLUENZA B AGN: NEGATIVE
SARS-COV-2 RDRP RESP QL NAA+PROBE: NEGATIVE

## 2021-10-29 PROCEDURE — 99284 EMERGENCY DEPT VISIT MOD MDM: CPT

## 2021-10-29 PROCEDURE — U0002 COVID-19 LAB TEST NON-CDC: HCPCS | Performed by: EMERGENCY MEDICINE

## 2021-10-29 RX ORDER — ALBUTEROL SULFATE 90 UG/1
1-2 AEROSOL, METERED RESPIRATORY (INHALATION) EVERY 6 HOURS PRN
Qty: 8 G | Refills: 0 | Status: SHIPPED | OUTPATIENT
Start: 2021-10-29 | End: 2022-09-04

## 2021-10-29 RX ORDER — CETIRIZINE HYDROCHLORIDE 10 MG/1
10 TABLET ORAL DAILY
Qty: 30 TABLET | Refills: 0 | OUTPATIENT
Start: 2021-10-29 | End: 2023-02-13

## 2021-10-29 RX ORDER — BENZONATATE 100 MG/1
100 CAPSULE ORAL 3 TIMES DAILY PRN
Qty: 20 CAPSULE | Refills: 0 | Status: SHIPPED | OUTPATIENT
Start: 2021-10-29 | End: 2021-11-08

## 2022-04-12 ENCOUNTER — HOSPITAL ENCOUNTER (EMERGENCY)
Facility: HOSPITAL | Age: 37
Discharge: LEFT BEFORE DISCHARGE | End: 2022-04-12

## 2022-04-12 VITALS
HEART RATE: 92 BPM | TEMPERATURE: 98 F | OXYGEN SATURATION: 97 % | SYSTOLIC BLOOD PRESSURE: 129 MMHG | DIASTOLIC BLOOD PRESSURE: 61 MMHG | RESPIRATION RATE: 20 BRPM

## 2022-04-12 LAB
B-HCG UR QL: NEGATIVE
BILIRUB UR QL STRIP: NEGATIVE
CLARITY UR: CLEAR
COLOR UR: YELLOW
CTP QC/QA: YES
GLUCOSE UR QL STRIP: NEGATIVE
HGB UR QL STRIP: NEGATIVE
KETONES UR QL STRIP: NEGATIVE
LEUKOCYTE ESTERASE UR QL STRIP: NEGATIVE
NITRITE UR QL STRIP: NEGATIVE
PH UR STRIP: 7 [PH] (ref 5–8)
PROT UR QL STRIP: ABNORMAL
SP GR UR STRIP: >1.03 (ref 1–1.03)
URN SPEC COLLECT METH UR: ABNORMAL
UROBILINOGEN UR STRIP-ACNC: NEGATIVE EU/DL

## 2022-04-12 PROCEDURE — 81025 URINE PREGNANCY TEST: CPT | Performed by: PHYSICIAN ASSISTANT

## 2022-04-12 PROCEDURE — 81003 URINALYSIS AUTO W/O SCOPE: CPT | Performed by: PHYSICIAN ASSISTANT

## 2022-04-12 PROCEDURE — 25000003 PHARM REV CODE 250: Performed by: PHYSICIAN ASSISTANT

## 2022-04-12 PROCEDURE — 99283 EMERGENCY DEPT VISIT LOW MDM: CPT | Mod: 25

## 2022-04-12 RX ORDER — IBUPROFEN 600 MG/1
600 TABLET ORAL
Status: COMPLETED | OUTPATIENT
Start: 2022-04-12 | End: 2022-04-12

## 2022-04-12 RX ADMIN — IBUPROFEN 600 MG: 600 TABLET ORAL at 05:04

## 2022-04-12 NOTE — FIRST PROVIDER EVALUATION
Emergency Department TeleTriage Encounter Note      CHIEF COMPLAINT    Chief Complaint   Patient presents with    Back Pain     C/o left sided lower back pain x 2 days. Denies dysuira, Denies loss of bowel or bladder. Denies numbness or tingling. Denies injury. No distress noted       VITAL SIGNS   Initial Vitals [04/12/22 1529]   BP Pulse Resp Temp SpO2   129/61 92 20 97.8 °F (36.6 °C) 97 %      MAP       --            ALLERGIES    Review of patient's allergies indicates:   Allergen Reactions    Flexeril [cyclobenzaprine] Anxiety    Tramadol Anxiety       PROVIDER TRIAGE NOTE  HPI: Sparkle Cuh, a 36 y.o. female presents to the ED for evaluation of atraumatic back pain to lumbar area bilaterally.  Denies loss of bowel or bladder.  No urinary symptoms.  Tried tylenol with little improvement.          ORDERS  Labs Reviewed - No data to display    ED Orders (720h ago, onward)    None            Virtual Visit Note: The provider triage portion of this emergency department evaluation and documentation was performed via [employer], a HIPAA-compliant telemedicine application, in concert with a tele-presenter in the room. A face to face patient evaluation with one of my colleagues will occur once the patient is placed in an emergency department room.      DISCLAIMER: This note was prepared with [employer] voice recognition transcription software. Garbled syntax, mangled pronouns, and other bizarre constructions may be attributed to that software system.

## 2022-04-13 ENCOUNTER — HOSPITAL ENCOUNTER (EMERGENCY)
Facility: HOSPITAL | Age: 37
Discharge: HOME | End: 2022-04-13
Attending: EMERGENCY MEDICINE

## 2022-04-13 VITALS
OXYGEN SATURATION: 99 % | BODY MASS INDEX: 36.36 KG/M2 | HEART RATE: 76 BPM | DIASTOLIC BLOOD PRESSURE: 76 MMHG | WEIGHT: 180 LBS | TEMPERATURE: 98 F | SYSTOLIC BLOOD PRESSURE: 132 MMHG | RESPIRATION RATE: 18 BRPM

## 2022-04-13 DIAGNOSIS — M54.50 ACUTE LEFT-SIDED LOW BACK PAIN WITHOUT SCIATICA: Primary | ICD-10-CM

## 2022-04-13 LAB
ANION GAP SERPL CALC-SCNC: 11 MMOL/L (ref 8–16)
B-HCG UR QL: NEGATIVE
BASOPHILS # BLD AUTO: 0.05 K/UL (ref 0–0.2)
BASOPHILS NFR BLD: 0.6 % (ref 0–1.9)
BILIRUB UR QL STRIP: NEGATIVE
BUN SERPL-MCNC: 12 MG/DL (ref 6–20)
CALCIUM SERPL-MCNC: 9.1 MG/DL (ref 8.7–10.5)
CHLORIDE SERPL-SCNC: 107 MMOL/L (ref 95–110)
CLARITY UR: CLEAR
CO2 SERPL-SCNC: 20 MMOL/L (ref 23–29)
COLOR UR: YELLOW
CREAT SERPL-MCNC: 0.6 MG/DL (ref 0.5–1.4)
CTP QC/QA: YES
DIFFERENTIAL METHOD: ABNORMAL
EOSINOPHIL # BLD AUTO: 0.1 K/UL (ref 0–0.5)
EOSINOPHIL NFR BLD: 1.1 % (ref 0–8)
ERYTHROCYTE [DISTWIDTH] IN BLOOD BY AUTOMATED COUNT: 13 % (ref 11.5–14.5)
EST. GFR  (AFRICAN AMERICAN): >60 ML/MIN/1.73 M^2
EST. GFR  (NON AFRICAN AMERICAN): >60 ML/MIN/1.73 M^2
GLUCOSE SERPL-MCNC: 112 MG/DL (ref 70–110)
GLUCOSE UR QL STRIP: NEGATIVE
HCT VFR BLD AUTO: 35.7 % (ref 37–48.5)
HGB BLD-MCNC: 12 G/DL (ref 12–16)
HGB UR QL STRIP: NEGATIVE
IMM GRANULOCYTES # BLD AUTO: 0.04 K/UL (ref 0–0.04)
IMM GRANULOCYTES NFR BLD AUTO: 0.4 % (ref 0–0.5)
KETONES UR QL STRIP: NEGATIVE
LEUKOCYTE ESTERASE UR QL STRIP: NEGATIVE
LYMPHOCYTES # BLD AUTO: 2.7 K/UL (ref 1–4.8)
LYMPHOCYTES NFR BLD: 30 % (ref 18–48)
MCH RBC QN AUTO: 28.1 PG (ref 27–31)
MCHC RBC AUTO-ENTMCNC: 33.6 G/DL (ref 32–36)
MCV RBC AUTO: 84 FL (ref 82–98)
MONOCYTES # BLD AUTO: 0.5 K/UL (ref 0.3–1)
MONOCYTES NFR BLD: 5.4 % (ref 4–15)
NEUTROPHILS # BLD AUTO: 5.6 K/UL (ref 1.8–7.7)
NEUTROPHILS NFR BLD: 62.5 % (ref 38–73)
NITRITE UR QL STRIP: NEGATIVE
NRBC BLD-RTO: 0 /100 WBC
PH UR STRIP: 6 [PH] (ref 5–8)
PLATELET # BLD AUTO: 290 K/UL (ref 150–450)
PMV BLD AUTO: 10.2 FL (ref 9.2–12.9)
POTASSIUM SERPL-SCNC: 4.1 MMOL/L (ref 3.5–5.1)
PROT UR QL STRIP: NEGATIVE
RBC # BLD AUTO: 4.27 M/UL (ref 4–5.4)
SODIUM SERPL-SCNC: 138 MMOL/L (ref 136–145)
SP GR UR STRIP: 1.02 (ref 1–1.03)
URN SPEC COLLECT METH UR: NORMAL
UROBILINOGEN UR STRIP-ACNC: NEGATIVE EU/DL
WBC # BLD AUTO: 8.94 K/UL (ref 3.9–12.7)

## 2022-04-13 PROCEDURE — 81025 URINE PREGNANCY TEST: CPT | Performed by: EMERGENCY MEDICINE

## 2022-04-13 PROCEDURE — 80048 BASIC METABOLIC PNL TOTAL CA: CPT | Performed by: EMERGENCY MEDICINE

## 2022-04-13 PROCEDURE — 63600175 PHARM REV CODE 636 W HCPCS: Performed by: EMERGENCY MEDICINE

## 2022-04-13 PROCEDURE — 96374 THER/PROPH/DIAG INJ IV PUSH: CPT

## 2022-04-13 PROCEDURE — 25000003 PHARM REV CODE 250: Performed by: EMERGENCY MEDICINE

## 2022-04-13 PROCEDURE — 81003 URINALYSIS AUTO W/O SCOPE: CPT | Performed by: EMERGENCY MEDICINE

## 2022-04-13 PROCEDURE — 96361 HYDRATE IV INFUSION ADD-ON: CPT

## 2022-04-13 PROCEDURE — 99285 EMERGENCY DEPT VISIT HI MDM: CPT | Mod: 25

## 2022-04-13 PROCEDURE — 85025 COMPLETE CBC W/AUTO DIFF WBC: CPT | Performed by: EMERGENCY MEDICINE

## 2022-04-13 RX ORDER — KETOROLAC TROMETHAMINE 30 MG/ML
10 INJECTION, SOLUTION INTRAMUSCULAR; INTRAVENOUS
Status: COMPLETED | OUTPATIENT
Start: 2022-04-13 | End: 2022-04-13

## 2022-04-13 RX ORDER — METHOCARBAMOL 750 MG/1
1500 TABLET, FILM COATED ORAL 3 TIMES DAILY
Qty: 30 TABLET | Refills: 0 | Status: SHIPPED | OUTPATIENT
Start: 2022-04-13 | End: 2022-04-18

## 2022-04-13 RX ORDER — MELOXICAM 15 MG/1
15 TABLET ORAL DAILY
Qty: 14 TABLET | Refills: 0 | OUTPATIENT
Start: 2022-04-13 | End: 2023-02-13

## 2022-04-13 RX ADMIN — SODIUM CHLORIDE 1000 ML: 0.9 INJECTION, SOLUTION INTRAVENOUS at 09:04

## 2022-04-13 RX ADMIN — KETOROLAC TROMETHAMINE 10 MG: 30 INJECTION, SOLUTION INTRAMUSCULAR at 08:04

## 2022-04-13 NOTE — Clinical Note
"Sparkle"Dinora Chu was seen and treated in our emergency department on 4/13/2022.  She may return to work on 04/16/2022.       If you have any questions or concerns, please don't hesitate to call.      Sofia Dupont MD"

## 2022-04-13 NOTE — ED PROVIDER NOTES
Encounter Date: 2022       History     Chief Complaint   Patient presents with    Back Pain     Pt c/o left sided mid back pain that radiates to left buttock but not down leg. Onset was Monday but denies injury, trauma or dysuria. Pain is worse with movement. Slow, steady gait. Came yesterday but left after waiting several hours.      HPI     37 yo female presents for atraumatic lower back pain.  Left flank radiates down and anteriorly.  Onset Monday, intermittent, worsening.  Denies fever, history of IV drug use, history of cancer, trauma, numbness, tingling, weakness, urinary retention or incontinence, fecal incontinence.  Denies any urinary changes.  No history of kidney stones.      Review of patient's allergies indicates:   Allergen Reactions    Flexeril [cyclobenzaprine] Anxiety    Tramadol Anxiety     Past Medical History:   Diagnosis Date    Asthma     Bronchitis     Hemorrhoid     Sciatica      Past Surgical History:   Procedure Laterality Date     SECTION       No family history on file.  Social History     Tobacco Use    Smoking status: Never Smoker    Smokeless tobacco: Never Used   Substance Use Topics    Alcohol use: No    Drug use: No     Review of Systems     General: No fever.  No chills.  Eyes: No visual changes.  Head: No headache.    Integument: No rashes or lesions.  Chest: No shortness of breath.  Cardiovascular: No chest pain.  Abdomen: No abdominal pain.  No nausea or vomiting.  Urinary: No abnormal urination.  Neurologic: No focal weakness.  No numbness.  Hematologic: No easy bruising.  Endocrine: No excessive thirst or urination.      Physical Exam     Initial Vitals [22 0811]   BP Pulse Resp Temp SpO2   127/72 93 18 97.7 °F (36.5 °C) 99 %      MAP       --         Physical Exam    Appearance: No acute distress.  HEENT: Normocephalic. Atraumatic. No conjunctival injection. EOMI. PERRL.    Neck: No JVD. Neck supple.    Chest: Non-tender. No respiratory  distress  Cardiovascular: Regular rate and rhythm. +2 radial pulses bilaterally.  Abdomen: Not distended. Nontender  : +L CVA TTP   Musculoskeletal: Good range of motion all joints. No deformities. No midline c-t-l spine ttp or step-offs. NVI distally.   Neurologic: Alert and oriented x 3.  Equal strength in upper and lower extremities bilaterally. Normal sensation. No facial droop. Normal speech.    Psych:  Appropriate, conversant   Integumentary: No rashes seen.  Good turgor.  No abrasions.  No ecchymoses.      ED Course   Procedures  Labs Reviewed   CBC W/ AUTO DIFFERENTIAL - Abnormal; Notable for the following components:       Result Value    Hematocrit 35.7 (*)     All other components within normal limits   BASIC METABOLIC PANEL - Abnormal; Notable for the following components:    CO2 20 (*)     Glucose 112 (*)     All other components within normal limits   URINALYSIS, REFLEX TO URINE CULTURE    Narrative:     Specimen Source->Urine   POCT URINE PREGNANCY          Imaging Results          CT Renal Stone Study ABD Pelvis WO (Final result)  Result time 04/13/22 09:44:36    Final result by Seamus Kincaid MD (04/13/22 09:44:36)                 Impression:      No evidence of nephrolithiasis or obstructive uropathy.    Hepatomegaly.    Minimal nonspecific edema in the central mesentery.      Electronically signed by: Seamus Kincaid MD  Date:    04/13/2022  Time:    09:44             Narrative:    EXAMINATION:  CT RENAL STONE STUDY ABD PELVIS WO    CLINICAL HISTORY:  Flank pain, kidney stone suspected;left;    TECHNIQUE:  Low dose axial images, sagittal and coronal reformations were obtained from the lung bases to the pubic symphysis.  Oral contrast was not administered.    COMPARISON:  Abdominal ultrasound, 08/01/2016.    FINDINGS:  Lower chest: Heart size is normal. Lung bases are essentially clear. No pleural or pericardial effusion.    Abdomen:    Evaluation of the solid abdominal organs and bowel is  limited in the absence of IV contrast.    Liver is enlarged, measuring approximately 20 cm in craniocaudal length.  No focal hepatic mass identified on this noncontrast study.  Gallbladder is unremarkable. No calcified gallstones. No intra-or extrahepatic biliary ductal dilatation.    Spleen is not significantly enlarged.  Adrenal glands are unremarkable.  Pancreas is unremarkable on this noncontrast study.    Kidneys are symmetric.  No renal or ureteral stones. No hydronephrosis.    No distended loops of small bowel. Appendix is normal.  Minimal edema in the central mesentery, nonspecific.    Abdominal aorta is normal in course and caliber.    No abdominal lymphadenopathy.    No abdominal free fluid or pneumoperitoneum.    Pelvis: Urinary bladder, rectum, and pelvic organs are unremarkable.  No significant pelvic free fluid.    Bones and soft tissues: No aggressive osseous lesions. Postoperative changes in the anterior abdominal wall.                                 Medications   sodium chloride 0.9% bolus 1,000 mL (0 mLs Intravenous Stopped 4/13/22 1022)   ketorolac injection 9.999 mg (9.999 mg Intravenous Given 4/13/22 0858)                          Clinical Impression:   Final diagnoses:  [M54.50] Acute left-sided low back pain without sciatica (Primary)       37 yo female presents for atraumatic lower back pain. VSS, afeb. +L CVA TTP. No midline c-t-l spine ttp or step-offs. NVI distally. UPT neg. Gave toradol IV, IVF. Labs reassuring. CT ABD neg for acute pathology.  On reassessment patient improved, well appearing, nontoxic.  Advised Mobic with food to avoid stomach ulcer.      Discussed results, diagnosis, and treatment plan with pt; advised close follow-up with PCP. Reviewed strict return precautions. Pt confirms understanding and ability to comply.     ED Disposition Condition    Discharge Stable        ED Prescriptions     Medication Sig Dispense Start Date End Date Auth. Provider    meloxicam (MOBIC) 15  MG tablet Take 1 tablet (15 mg total) by mouth once daily. Take with food 14 tablet 4/13/2022  Sofia Dupont MD    methocarbamoL (ROBAXIN) 750 MG Tab Take 2 tablets (1,500 mg total) by mouth 3 (three) times daily. for 5 days 30 tablet 4/13/2022 4/18/2022 Sofia Dupont MD        Follow-up Information     Follow up With Specialties Details Why Contact Info Additional Information    [facility] Internal Medicine Schedule an appointment as soon as possible for a visit   [address] [facility] [address] [phone] Please park in surface lot and check in at the .           Sofia Dupont MD  04/13/22 1121

## 2022-09-01 ENCOUNTER — HOSPITAL ENCOUNTER (EMERGENCY)
Facility: HOSPITAL | Age: 37
Discharge: HOME | End: 2022-09-01
Attending: EMERGENCY MEDICINE

## 2022-09-01 VITALS
SYSTOLIC BLOOD PRESSURE: 122 MMHG | OXYGEN SATURATION: 99 % | DIASTOLIC BLOOD PRESSURE: 87 MMHG | RESPIRATION RATE: 18 BRPM | BODY MASS INDEX: 39.79 KG/M2 | HEART RATE: 88 BPM | WEIGHT: 197 LBS | TEMPERATURE: 99 F

## 2022-09-01 DIAGNOSIS — U07.1 COVID-19: Primary | ICD-10-CM

## 2022-09-01 LAB
INFLUENZA A, MOLECULAR: NEGATIVE
INFLUENZA B, MOLECULAR: NEGATIVE
SARS-COV-2 RDRP RESP QL NAA+PROBE: POSITIVE
SPECIMEN SOURCE: NORMAL

## 2022-09-01 PROCEDURE — 87502 INFLUENZA DNA AMP PROBE: CPT

## 2022-09-01 PROCEDURE — 99284 EMERGENCY DEPT VISIT MOD MDM: CPT | Mod: 25

## 2022-09-01 PROCEDURE — 87502 INFLUENZA DNA AMP PROBE: CPT | Performed by: NURSE PRACTITIONER

## 2022-09-01 PROCEDURE — U0002 COVID-19 LAB TEST NON-CDC: HCPCS | Performed by: NURSE PRACTITIONER

## 2022-09-01 RX ORDER — FLUTICASONE PROPIONATE 50 MCG
1 SPRAY, SUSPENSION (ML) NASAL 2 TIMES DAILY PRN
Qty: 15 G | Refills: 0 | OUTPATIENT
Start: 2022-09-01 | End: 2023-02-13

## 2022-09-01 RX ORDER — BENZONATATE 100 MG/1
100 CAPSULE ORAL 3 TIMES DAILY PRN
Qty: 20 CAPSULE | Refills: 0 | Status: SHIPPED | OUTPATIENT
Start: 2022-09-01 | End: 2022-09-11

## 2022-09-01 NOTE — Clinical Note
"Sparkle"Dinora Chu was seen and treated in our emergency department on 9/1/2022.     COVID-19 is present in our communities across the state. There is limited testing for COVID at this time, so not all patients can be tested. In this situation, your employee meets the following criteria:    Sparkle Chu has met the criteria for COVID-19 testing and has a POSITIVE result. She can return to work once they are asymptomatic for 24 hours without the use of fever reducing medications AND at least five days from the first positive result. A mask is recommended for 5 days post quarantine.     If you have any questions or concerns, or if I can be of further assistance, please do not hesitate to contact me.    Sincerely,             Maribell Batista NP"

## 2022-09-01 NOTE — ED PROVIDER NOTES
"Encounter Date: 2022    SCRIBE #1 NOTE: I, Carlos Sera Jr, am scribing for, and in the presence of,  Maribell Batista NP. I have scribed the following portions of the note - Other sections scribed: HPI, ROS, PE, MDM.     History     Chief Complaint   Patient presents with    Influenza     Pt states "I think I have the flu" Pt reports cough, congestion, body aches, n/v/d x a few days.      Sparkle Chu is a 36 y.o. female who has a past medical history of asthma, bronchitis, hemorrhoid, and sciatica. The patient presents to the ED due to influenza symptoms; onset four days ago. Associated symptoms include chills, congestion, cough, myalgias, nausea, and vomiting. The patient reports that she was in close proximity of two individuals diagnosed with influenza; she is currently experimenting flu-like symptoms. Patient states she experienced vomiting on Monday, but it has not occurred since. She denies chest pain, fever, neck stiffness, rash, and shortness of breath. The patient is allergic to flexeril and tramadol.         The history is provided by the patient. No  was used.   Review of patient's allergies indicates:   Allergen Reactions    Flexeril [cyclobenzaprine] Anxiety    Tramadol Anxiety     Past Medical History:   Diagnosis Date    Asthma     Bronchitis     Hemorrhoid     Sciatica      Past Surgical History:   Procedure Laterality Date     SECTION       No family history on file.  Social History     Tobacco Use    Smoking status: Never    Smokeless tobacco: Never   Substance Use Topics    Alcohol use: No    Drug use: No     Review of Systems   Constitutional:  Positive for chills. Negative for fever.   HENT:  Positive for congestion. Negative for sore throat.    Respiratory:  Positive for cough. Negative for shortness of breath.    Cardiovascular:  Negative for chest pain.   Gastrointestinal:  Positive for nausea and vomiting.   Genitourinary:  Negative for dysuria. "   Musculoskeletal:  Positive for myalgias. Negative for back pain and neck stiffness.   Skin:  Negative for rash.   Neurological:  Negative for weakness.   Hematological:  Does not bruise/bleed easily.     Physical Exam     Initial Vitals [09/01/22 0940]   BP Pulse Resp Temp SpO2   122/87 88 18 98.9 °F (37.2 °C) 99 %      MAP       --         Physical Exam    Nursing note and vitals reviewed.  Constitutional: She appears well-developed. No distress.   HENT:   Head: Normocephalic.   Congestion and rhinorrhea present.     Eyes: Conjunctivae are normal. Pupils are equal, round, and reactive to light.   Neck:   Normal range of motion.  Cardiovascular:  Normal rate and normal heart sounds.     Exam reveals no gallop and no friction rub.       No murmur heard.  Pulmonary/Chest: Breath sounds normal. No respiratory distress. She has no wheezes. She has no rhonchi. She has no rales.   Abdominal: Abdomen is soft. There is no abdominal tenderness.   Musculoskeletal:      Cervical back: Normal range of motion. No edema, erythema or rigidity. Normal range of motion.     Neurological: She is alert and oriented to person, place, and time.   Skin: Skin is warm and dry. No rash noted.   Psychiatric: She has a normal mood and affect.       ED Course   Procedures  Labs Reviewed   SARS-COV-2 RNA AMPLIFICATION, QUAL - Abnormal; Notable for the following components:       Result Value    SARS-CoV-2 RNA, Amplification, Qual Positive (*)     All other components within normal limits    Narrative:       Covid result(s) called and verbal readback obtained from Maribell Tiwari by DENNISE 09/01/2022 10:22   INFLUENZA A & B BY MOLECULAR          Imaging Results    None          Medications - No data to display  Medical Decision Making:   History:   Old Medical Records: I decided to obtain old medical records.  Initial Assessment:   Sparkle Chu is a 36 y.o. female who has a past medical history of asthma, bronchitis, hemorrhoid, and  sciatica. The patient presents to the ED due to influenza symptoms; onset four days ago. Associated symptoms include chills, congestion, cough, myalgias, nausea, and vomiting.   Differential Diagnosis:   Differential Diagnosis includes, but is not limited to:  Viral URI, Strep pharyngitis, viral pharyngitis, foreign body aspiration/ingestion, bronchitis, asthma exacerbation, CHF exacerbation, COPD exacerbation, allergy/atopy, influenza, pertussis, PE, pneumonia, lung abscess, fungal infection, TB, epiglottitis.   Clinical Tests:   Lab Tests: Ordered and Reviewed        Scribe Attestation:   Scribe #1: I performed the above scribed service and the documentation accurately describes the services I performed. I attest to the accuracy of the note.      ED Course as of 09/01/22 1724   Thu Sep 01, 2022   1022 Patient notified of the need for follow-up care with the primary care physician in which a referral was sent today.  She will be given Paxil bid in addition to Tessalon and Flonase.  Strict return precautions have been given.  She is to quarantine.  She is stable at this time for discharge. [DT]      ED Course User Index  [DT] Maribell Batista, MICHAEL             I, Maribell Batista NP, personally performed the services described in this documentation.All medical record entries made by the scribe were at my direction and in my presence.I have reviewed the chart and agree that the record reflects my personal performance and is accurate and complete.    Clinical Impression:   Final diagnoses:  [U07.1] COVID-19 (Primary)      ED Disposition Condition    Discharge Stable          ED Prescriptions       Medication Sig Dispense Start Date End Date Auth. Provider    nirmatrelvir-ritonavir 300 mg (150 mg x 2)-100 mg copackaged tablets (EUA) Take 3 tablets by mouth 2 (two) times daily for 5 days. Each dose contains 2 nirmatrelvir (pink tablets) and 1 ritonavir (white tablet). Take all 3 tablets together 30 tablet 9/1/2022 9/6/2022  Maribell Batista NP    benzonatate (TESSALON) 100 MG capsule Take 1 capsule (100 mg total) by mouth 3 (three) times daily as needed for Cough. 20 capsule 9/1/2022 9/11/2022 Maribell Batista NP    fluticasone propionate (FLONASE) 50 mcg/actuation nasal spray 1 spray (50 mcg total) by Each Nostril route 2 (two) times daily as needed for Rhinitis. 15 g 9/1/2022 -- Maribell Batista NP          Follow-up Information       Follow up With Specialties Details Why Contact Info Additional Information    [facility] Family Medicine Schedule an appointment as soon as possible for a visit in 2 days  [address] [facility] [facility] [address] [facility], [address]  [facility] [address] [phone] Please park in [address] or D and use Kunal alonso. Take Medical Office Bldg. elevators.             Maribell Batista NP  09/01/22 0745

## 2022-09-04 ENCOUNTER — HOSPITAL ENCOUNTER (EMERGENCY)
Facility: HOSPITAL | Age: 37
Discharge: HOME | End: 2022-09-04
Attending: EMERGENCY MEDICINE

## 2022-09-04 DIAGNOSIS — R07.9 CHEST PAIN: ICD-10-CM

## 2022-09-04 DIAGNOSIS — R06.00 DYSPNEA: ICD-10-CM

## 2022-09-04 DIAGNOSIS — U07.1 COVID: Primary | ICD-10-CM

## 2022-09-04 LAB
ALBUMIN SERPL BCP-MCNC: 3.8 G/DL (ref 3.5–5.2)
ALP SERPL-CCNC: 72 U/L (ref 55–135)
ALT SERPL W/O P-5'-P-CCNC: 28 U/L (ref 10–44)
ANION GAP SERPL CALC-SCNC: 12 MMOL/L (ref 8–16)
AST SERPL-CCNC: 17 U/L (ref 10–40)
B-HCG UR QL: NEGATIVE
BASOPHILS # BLD AUTO: 0.05 K/UL (ref 0–0.2)
BASOPHILS NFR BLD: 0.4 % (ref 0–1.9)
BILIRUB SERPL-MCNC: 0.2 MG/DL (ref 0.1–1)
BNP SERPL-MCNC: <10 PG/ML (ref 0–99)
BUN SERPL-MCNC: 11 MG/DL (ref 6–20)
CALCIUM SERPL-MCNC: 9.3 MG/DL (ref 8.7–10.5)
CHLORIDE SERPL-SCNC: 104 MMOL/L (ref 95–110)
CO2 SERPL-SCNC: 23 MMOL/L (ref 23–29)
CREAT SERPL-MCNC: 0.7 MG/DL (ref 0.5–1.4)
CTP QC/QA: YES
DIFFERENTIAL METHOD: ABNORMAL
EOSINOPHIL # BLD AUTO: 0.2 K/UL (ref 0–0.5)
EOSINOPHIL NFR BLD: 1.8 % (ref 0–8)
ERYTHROCYTE [DISTWIDTH] IN BLOOD BY AUTOMATED COUNT: 13 % (ref 11.5–14.5)
EST. GFR  (NO RACE VARIABLE): >60 ML/MIN/1.73 M^2
GLUCOSE SERPL-MCNC: 93 MG/DL (ref 70–110)
HCT VFR BLD AUTO: 34 % (ref 37–48.5)
HGB BLD-MCNC: 11.4 G/DL (ref 12–16)
IMM GRANULOCYTES # BLD AUTO: 0.03 K/UL (ref 0–0.04)
IMM GRANULOCYTES NFR BLD AUTO: 0.3 % (ref 0–0.5)
LYMPHOCYTES # BLD AUTO: 4.2 K/UL (ref 1–4.8)
LYMPHOCYTES NFR BLD: 36.8 % (ref 18–48)
MCH RBC QN AUTO: 27.7 PG (ref 27–31)
MCHC RBC AUTO-ENTMCNC: 33.5 G/DL (ref 32–36)
MCV RBC AUTO: 83 FL (ref 82–98)
MONOCYTES # BLD AUTO: 0.6 K/UL (ref 0.3–1)
MONOCYTES NFR BLD: 5 % (ref 4–15)
NEUTROPHILS # BLD AUTO: 6.3 K/UL (ref 1.8–7.7)
NEUTROPHILS NFR BLD: 55.7 % (ref 38–73)
NRBC BLD-RTO: 0 /100 WBC
PLATELET # BLD AUTO: 331 K/UL (ref 150–450)
PMV BLD AUTO: 10.2 FL (ref 9.2–12.9)
POTASSIUM SERPL-SCNC: 3.7 MMOL/L (ref 3.5–5.1)
PROT SERPL-MCNC: 7.5 G/DL (ref 6–8.4)
RBC # BLD AUTO: 4.12 M/UL (ref 4–5.4)
SODIUM SERPL-SCNC: 139 MMOL/L (ref 136–145)
TROPONIN I SERPL DL<=0.01 NG/ML-MCNC: 0.01 NG/ML (ref 0–0.03)
WBC # BLD AUTO: 11.28 K/UL (ref 3.9–12.7)

## 2022-09-04 PROCEDURE — 85025 COMPLETE CBC W/AUTO DIFF WBC: CPT | Performed by: EMERGENCY MEDICINE

## 2022-09-04 PROCEDURE — 25000242 PHARM REV CODE 250 ALT 637 W/ HCPCS: Performed by: EMERGENCY MEDICINE

## 2022-09-04 PROCEDURE — 93010 ELECTROCARDIOGRAM REPORT: CPT | Mod: ,,, | Performed by: INTERNAL MEDICINE

## 2022-09-04 PROCEDURE — 84484 ASSAY OF TROPONIN QUANT: CPT | Performed by: EMERGENCY MEDICINE

## 2022-09-04 PROCEDURE — 81025 URINE PREGNANCY TEST: CPT | Performed by: EMERGENCY MEDICINE

## 2022-09-04 PROCEDURE — 83880 ASSAY OF NATRIURETIC PEPTIDE: CPT | Performed by: EMERGENCY MEDICINE

## 2022-09-04 PROCEDURE — 99900031 HC PATIENT EDUCATION (STAT)

## 2022-09-04 PROCEDURE — 25000003 PHARM REV CODE 250: Performed by: EMERGENCY MEDICINE

## 2022-09-04 PROCEDURE — 94640 AIRWAY INHALATION TREATMENT: CPT

## 2022-09-04 PROCEDURE — 93005 ELECTROCARDIOGRAM TRACING: CPT

## 2022-09-04 PROCEDURE — 93010 EKG 12-LEAD: ICD-10-PCS | Mod: ,,, | Performed by: INTERNAL MEDICINE

## 2022-09-04 PROCEDURE — 99285 EMERGENCY DEPT VISIT HI MDM: CPT | Mod: 25

## 2022-09-04 PROCEDURE — 80053 COMPREHEN METABOLIC PANEL: CPT | Performed by: EMERGENCY MEDICINE

## 2022-09-04 RX ORDER — CHLORHEXIDINE GLUCONATE ORAL RINSE 1.2 MG/ML
15 SOLUTION DENTAL 2 TIMES DAILY
Qty: 420 ML | Refills: 0 | Status: SHIPPED | OUTPATIENT
Start: 2022-09-04 | End: 2022-09-18

## 2022-09-04 RX ORDER — ALBUTEROL SULFATE 90 UG/1
4 AEROSOL, METERED RESPIRATORY (INHALATION) ONCE
Status: COMPLETED | OUTPATIENT
Start: 2022-09-04 | End: 2022-09-04

## 2022-09-04 RX ORDER — IBUPROFEN 600 MG/1
600 TABLET ORAL
Status: COMPLETED | OUTPATIENT
Start: 2022-09-04 | End: 2022-09-04

## 2022-09-04 RX ORDER — ALBUTEROL SULFATE 90 UG/1
4 AEROSOL, METERED RESPIRATORY (INHALATION) ONCE
Status: DISCONTINUED | OUTPATIENT
Start: 2022-09-04 | End: 2022-09-04

## 2022-09-04 RX ORDER — ALBUTEROL SULFATE 90 UG/1
1-2 AEROSOL, METERED RESPIRATORY (INHALATION) EVERY 4 HOURS PRN
Qty: 18 G | Refills: 0 | OUTPATIENT
Start: 2022-09-04 | End: 2023-02-13

## 2022-09-04 RX ORDER — ALBUTEROL SULFATE 90 UG/1
2 AEROSOL, METERED RESPIRATORY (INHALATION) ONCE
Status: DISCONTINUED | OUTPATIENT
Start: 2022-09-04 | End: 2022-09-04

## 2022-09-04 RX ADMIN — ALBUTEROL SULFATE 4 PUFF: 90 AEROSOL, METERED RESPIRATORY (INHALATION) at 09:09

## 2022-09-04 RX ADMIN — IBUPROFEN 600 MG: 600 TABLET ORAL at 09:09

## 2022-09-05 VITALS
DIASTOLIC BLOOD PRESSURE: 73 MMHG | WEIGHT: 190 LBS | HEIGHT: 59 IN | HEART RATE: 70 BPM | RESPIRATION RATE: 18 BRPM | BODY MASS INDEX: 38.3 KG/M2 | TEMPERATURE: 99 F | OXYGEN SATURATION: 98 % | SYSTOLIC BLOOD PRESSURE: 130 MMHG

## 2022-09-05 NOTE — ED NOTES
Patient ambulatory in room pacing back and forth.  States she feels short of breath.  Speaking in full sentences.  RR regular and non labored at 16/minute.  Pulse ox 98% on room air.

## 2022-09-05 NOTE — ED PROVIDER NOTES
Encounter Date: 2022       History     Chief Complaint   Patient presents with    Chest Pain     Patient reports having tested positive for covid on 22. Complains today of having chest pain, shortness of breath, sore throat, and generalized weakness.     Shortness of Breath    Sore Throat    Weakness     Sparkle Chu is a 36 y.o. female who  has a past medical history of Asthma, Bronchitis, Hemorrhoid, and Sciatica.    The patient presents to the ED due to shortness of breath and chest tightness.  Patient was diagnosed with COVID on 2022.  She was discharged with Katharine and  Tessalon Perles.  She states that today she began to feel some tightness in her chest and had some difficulty breathing due to this.  She reports no fevers today no vomiting but does report diarrhea.  She reports associated muscle aches weakness and fatigue and sore throat.  No other concerns noted today she denies any medical problems.  She did receive her COVID vaccinations no history of smoking or asthma.    Review of patient's allergies indicates:   Allergen Reactions    Flexeril [cyclobenzaprine] Anxiety    Tramadol Anxiety     Past Medical History:   Diagnosis Date    Asthma     Bronchitis     Hemorrhoid     Sciatica      Past Surgical History:   Procedure Laterality Date     SECTION       No family history on file.  Social History     Tobacco Use    Smoking status: Never    Smokeless tobacco: Never   Substance Use Topics    Alcohol use: No    Drug use: No     Review of Systems   Constitutional:  Positive for activity change and fatigue. Negative for chills and fever.   HENT:  Positive for sore throat.    Respiratory:  Positive for chest tightness and shortness of breath. Negative for cough.    Cardiovascular:  Negative for chest pain.   Gastrointestinal:  Positive for diarrhea. Negative for nausea and vomiting.   Genitourinary:  Negative for dysuria, frequency and urgency.   Musculoskeletal:  Positive  for myalgias. Negative for back pain, neck pain and neck stiffness.   Skin:  Negative for rash and wound.   Neurological:  Positive for weakness. Negative for syncope.   Hematological:  Does not bruise/bleed easily.   Psychiatric/Behavioral:  Negative for agitation, behavioral problems and confusion.      Physical Exam     Initial Vitals [09/04/22 1938]   BP Pulse Resp Temp SpO2   (!) 135/90 84 16 98 °F (36.7 °C) 98 %      MAP       --         Physical Exam    Nursing note and vitals reviewed.  Constitutional: She appears well-developed and well-nourished. She is not diaphoretic. No distress.   HENT:   Head: Normocephalic and atraumatic.   Mouth/Throat: Oropharynx is clear and moist.   Eyes: EOM are normal. Pupils are equal, round, and reactive to light.   Neck: No tracheal deviation present. No JVD present.   Cardiovascular:  Normal rate, regular rhythm, normal heart sounds and intact distal pulses.           Pulmonary/Chest: Breath sounds normal. No stridor. No respiratory distress. She has no wheezes.   Abdominal: Abdomen is soft. Bowel sounds are normal. She exhibits no distension and no mass. There is no abdominal tenderness.   Musculoskeletal:         General: No edema. Normal range of motion.     Lymphadenopathy:     She has no cervical adenopathy.   Neurological: She is alert and oriented to person, place, and time. No cranial nerve deficit or sensory deficit.   Skin: Skin is warm and dry. Capillary refill takes less than 2 seconds. No rash noted.   Psychiatric: She has a normal mood and affect. Her behavior is normal. Thought content normal.       ED Course   Procedures  Labs Reviewed   CBC W/ AUTO DIFFERENTIAL - Abnormal; Notable for the following components:       Result Value    Hemoglobin 11.4 (*)     Hematocrit 34.0 (*)     All other components within normal limits   POCT URINE PREGNANCY - Normal   B-TYPE NATRIURETIC PEPTIDE   COMPREHENSIVE METABOLIC PANEL   TROPONIN I          Imaging Results               X-Ray Chest 1 View (Final result)  Result time 09/04/22 22:31:45      Final result by Stephen Rodríguez MD (09/04/22 22:31:45)                   Impression:      No acute abnormality.      Electronically signed by: Stephen Rodríguez  Date:    09/04/2022  Time:    22:31               Narrative:    EXAMINATION:  XR CHEST 1 VIEW    CLINICAL HISTORY:  Dyspnea, unspecified    TECHNIQUE:  Single frontal view of the chest was performed.    COMPARISON:  10/09/2017    FINDINGS:  The lungs are clear, with normal appearance of pulmonary vasculature and no pleural effusion or pneumothorax.    The cardiac silhouette is normal in size. The hilar and mediastinal contours are unremarkable.    Bones are intact.                                       Medications   ibuprofen tablet 600 mg (600 mg Oral Given 9/4/22 2115)   albuterol inhaler 4 puff (4 puffs Inhalation Given 9/4/22 2147)     Medical Decision Making:   Initial Assessment:   Ms. Augustine is a 36-year-old woman with no significant medical problems presenting today with chest tightness and shortness of breath after being diagnosed with COVID.  Her vital signs are stable.  Her lungs are clear to auscultation.  Will plan to obtain chest x-ray labs treat her symptoms and reassess.    Differential Diagnosis:   Differential Diagnosis includes, but is not limited to:  Viral URI, Strep pharyngitis, viral pharyngitis, foreign body aspiration/ingestion, bronchitis, asthma exacerbation, CHF exacerbation, COPD exacerbation, allergy/atopy, influenza, pertussis, PE, pneumonia, lung abscess, fungal infection, TB, epiglottitis.    ED Management:  After taking into careful account the historical factors and physical exam findings of the patient's presentation today, in conjunction with the empirical and objective data obtained on ED workup, no acute emergent medical condition has been identified. The patient appears to be low risk for an emergent medical condition and I feel it is safe  and appropriate at this time for the patient to be discharged to follow-up as detailed in their discharge instructions for reevaluation and possible continued outpatient workup and management. I have discussed the specifics of the workup with the patient and the patient has verbalized understanding of the details of the workup, the diagnosis, the treatment plan, and the need for outpatient follow-up.  Although the patient has no emergent etiology today this does not preclude the development of an emergent condition so in addition, I have advised the patient that they can return to the ED and/or activate EMS at any time with worsening of their symptoms, change of their symptoms, or with any other medical complaint.  The patient remained comfortable and stable during their visit in the ED.  Discharge and follow-up instructions discussed with the patient who expressed understanding and willingness to comply with my recommendations.               ED Course as of 09/04/22 2341   Sun Sep 04, 2022   2101 EKG: Rate 86.  Normal sinus rhythm.  No STEMI.  No ectopy [RN]   9250 Patient's labs are without significant abnormality.  She is resting comfortably in no apparent distress.  Chest x-ray without findings to suggest pneumonia.  No emergent process has been identified today.  Patient appears stable for discharge.  She ambulated with oxygen saturations greater than 95%.  She feels comfortable discharge.  She felt better after an albuterol inhaler treatment so will provide this at discharge.  We discussed return precautions for worsening symptoms including worsening shortness of breath fever throat pain weakness or any other concerns.  Close follow-up with patient's PCP advised. [RN]      ED Course User Index  [RN] Brice Wright Jr., MD             Clinical Impression:   Final diagnoses:  [R07.9] Chest pain  [R06.00] Dyspnea  [U07.1] COVID (Primary)        ED Disposition Condition    Discharge Stable          ED  Prescriptions       Medication Sig Dispense Start Date End Date Auth. Provider    albuterol (PROVENTIL/VENTOLIN HFA) 90 mcg/actuation inhaler Inhale 1-2 puffs into the lungs every 4 (four) hours as needed for Wheezing. Rescue 18 g 9/4/2022 9/4/2023 Brice Wright Jr., MD    chlorhexidine (PERIDEX) 0.12 % solution Use as directed 15 mLs in the mouth or throat 2 (two) times daily. As needed for throat pain for 14 days 420 mL 9/4/2022 9/18/2022 Brice Wright Jr., MD          Follow-up Information    None         Portions of this note were dictated using voice recognition software and may contain dictation related errors in spelling/grammar/syntax not found on text review       Brice Wright Jr., MD  09/04/22 3278

## 2022-09-05 NOTE — ED NOTES
APPEARANCE: Awake, alert, & oriented. No acute distress. Endorses generalized weakness  CARDIAC: Normal rate and rhythm. Denies chest pain.     RESPIRATORY: Respirations are even and unlabored no obvious signs of distress. No accessory muscle use. Breath sounds clear bilaterally throughout chest. Patient reports difficulty in taking a deep breath  PERIPHERAL VASCULAR: peripheral pulses present. Normal cap refill. No edema.   GASTRO: soft, no tenderness, no abdominal distention.  MUSC: Full ROM. No bony tenderness or soft tissue tenderness. No obvious deformity.  SKIN: Skin is warm, dry, and intact. Normal skin turgor and color.  NEURO: Equal strength bilaterally. Strawn coma scale: Eye Response-4, Motor Response-6, Verbal Response-5. Total=15. Clear speech. No neurological abnormalities.   EENT: No c/o vision or hearing difficulties. Oropharynx clear.

## 2023-02-13 ENCOUNTER — HOSPITAL ENCOUNTER (EMERGENCY)
Facility: HOSPITAL | Age: 38
Discharge: HOME | End: 2023-02-13
Attending: EMERGENCY MEDICINE

## 2023-02-13 VITALS
SYSTOLIC BLOOD PRESSURE: 143 MMHG | HEART RATE: 81 BPM | RESPIRATION RATE: 18 BRPM | BODY MASS INDEX: 37.77 KG/M2 | DIASTOLIC BLOOD PRESSURE: 86 MMHG | OXYGEN SATURATION: 98 % | WEIGHT: 187 LBS | TEMPERATURE: 98 F

## 2023-02-13 DIAGNOSIS — M54.50 ACUTE LEFT-SIDED LOW BACK PAIN WITHOUT SCIATICA: Primary | ICD-10-CM

## 2023-02-13 DIAGNOSIS — R11.0 NAUSEA: ICD-10-CM

## 2023-02-13 DIAGNOSIS — R19.7 DIARRHEA, UNSPECIFIED TYPE: ICD-10-CM

## 2023-02-13 LAB
B-HCG UR QL: NEGATIVE
CTP QC/QA: YES

## 2023-02-13 PROCEDURE — 63600175 PHARM REV CODE 636 W HCPCS: Performed by: EMERGENCY MEDICINE

## 2023-02-13 PROCEDURE — 99284 EMERGENCY DEPT VISIT MOD MDM: CPT | Mod: 25

## 2023-02-13 PROCEDURE — 25000003 PHARM REV CODE 250: Performed by: EMERGENCY MEDICINE

## 2023-02-13 PROCEDURE — 81025 URINE PREGNANCY TEST: CPT | Performed by: EMERGENCY MEDICINE

## 2023-02-13 PROCEDURE — 96372 THER/PROPH/DIAG INJ SC/IM: CPT | Performed by: EMERGENCY MEDICINE

## 2023-02-13 RX ORDER — ONDANSETRON 4 MG/1
4 TABLET, ORALLY DISINTEGRATING ORAL
Status: COMPLETED | OUTPATIENT
Start: 2023-02-13 | End: 2023-02-13

## 2023-02-13 RX ORDER — LIDOCAINE 50 MG/G
1 PATCH TOPICAL
Status: DISCONTINUED | OUTPATIENT
Start: 2023-02-13 | End: 2023-02-13 | Stop reason: HOSPADM

## 2023-02-13 RX ORDER — LIDOCAINE 50 MG/G
1 PATCH TOPICAL DAILY
Qty: 5 PATCH | Refills: 0 | Status: SHIPPED | OUTPATIENT
Start: 2023-02-13

## 2023-02-13 RX ORDER — ONDANSETRON 4 MG/1
4 TABLET, ORALLY DISINTEGRATING ORAL EVERY 6 HOURS PRN
Qty: 10 TABLET | Refills: 0 | Status: SHIPPED | OUTPATIENT
Start: 2023-02-13

## 2023-02-13 RX ORDER — KETOROLAC TROMETHAMINE 10 MG/1
10 TABLET, FILM COATED ORAL EVERY 8 HOURS
Qty: 15 TABLET | Refills: 0 | Status: SHIPPED | OUTPATIENT
Start: 2023-02-13 | End: 2023-02-18

## 2023-02-13 RX ORDER — METHOCARBAMOL 500 MG/1
1000 TABLET, FILM COATED ORAL 4 TIMES DAILY PRN
Qty: 30 TABLET | Refills: 0 | Status: SHIPPED | OUTPATIENT
Start: 2023-02-13

## 2023-02-13 RX ORDER — HYDROCODONE BITARTRATE AND ACETAMINOPHEN 5; 325 MG/1; MG/1
1 TABLET ORAL EVERY 6 HOURS PRN
Qty: 12 TABLET | Refills: 0 | Status: SHIPPED | OUTPATIENT
Start: 2023-02-13

## 2023-02-13 RX ORDER — KETOROLAC TROMETHAMINE 30 MG/ML
30 INJECTION, SOLUTION INTRAMUSCULAR; INTRAVENOUS
Status: COMPLETED | OUTPATIENT
Start: 2023-02-13 | End: 2023-02-13

## 2023-02-13 RX ADMIN — ONDANSETRON 4 MG: 4 TABLET, ORALLY DISINTEGRATING ORAL at 08:02

## 2023-02-13 RX ADMIN — LIDOCAINE 1 PATCH: 50 PATCH CUTANEOUS at 08:02

## 2023-02-13 RX ADMIN — KETOROLAC TROMETHAMINE 30 MG: 30 INJECTION, SOLUTION INTRAMUSCULAR; INTRAVENOUS at 08:02

## 2023-03-10 NOTE — ED PROVIDER NOTES
Encounter Date: 2023       History     Chief Complaint   Patient presents with    Back Pain     Pt c/o left lower back pain that started overnight Friday. Pt denies any injury or trauma. Pt also denies any dysuria. Pt ambulated into triage with steady gait.      Source of history:  Patient   History obtained without limitations.      HPI:  The patient is a 37-year-old with the below past medical history who presents with two days of moderate pain to her left lower back.  She denies trauma.  She denies dysuria, difficulty urinating, abnormal urine odor, and urine discoloration.  She denies abdominal pain, fever, chills, nausea, and vomiting.  She denies numbness to her groin.  She denies lower extremity weakness and numbness.  She denies incontinence.  The pain is exacerbated by movement.  It is not improved with over-the-counter analgesics.      Review of patient's allergies indicates:   Allergen Reactions    Flexeril [cyclobenzaprine] Anxiety    Tramadol Anxiety     Past Medical History:   Diagnosis Date    Asthma     Bronchitis     Hemorrhoid     Sciatica      Past Surgical History:   Procedure Laterality Date     SECTION       No family history on file.  Social History     Tobacco Use    Smoking status: Never    Smokeless tobacco: Never   Substance Use Topics    Alcohol use: No    Drug use: No     Review of Systems  See HPI.      Physical Exam     Initial Vitals [23 0757]   BP Pulse Resp Temp SpO2   (!) 143/86 81 18 97.9 °F (36.6 °C) 98 %      MAP       --         Physical Exam  Well-appearing patient.  No distress.    Abdomen is soft and nontender.      No step-offs or other palpable/visible deformities throughout the thoracic and lumbar spine.  No spinous process tenderness.  No paravertebral tenderness.  Left lumbar region peripheral tenderness.  No rashes or lesions to the back.    Normal strength and touch sensation throughout the lower extremities.  Normal gait.  ED Course    Procedures  Labs Reviewed   POCT URINE PREGNANCY          Imaging Results    None          Medications   ketorolac injection 30 mg (30 mg Intramuscular Given 2/13/23 0824)   ondansetron disintegrating tablet 4 mg (4 mg Oral Given 2/13/23 0824)                              Clinical Impression:   Final diagnoses:  [M54.50] Acute left-sided low back pain without sciatica (Primary)  [R11.0] Nausea  [R19.7] Diarrhea, unspecified type        ED Disposition Condition    Discharge Stable          ED Prescriptions       Medication Sig Dispense Start Date End Date Auth. Provider    ketorolac (TORADOL) 10 mg tablet  Take 1 tablet (10 mg total) by mouth every 8 (eight) hours. for 5 days 15 tablet 2/13/2023 2/18/2023 Erwin Kay III, MD    methocarbamoL (ROBAXIN) 500 MG Tab Take 2 tablets (1,000 mg total) by mouth 4 (four) times daily as needed (back pain). 30 tablet 2/13/2023 -- Erwin Kay III, MD    HYDROcodone-acetaminophen (NORCO) 5-325 mg per tablet Take 1 tablet by mouth every 6 (six) hours as needed (Moderate to severe pain). 12 tablet 2/13/2023 -- Erwin Kay III, MD    ondansetron (ZOFRAN-ODT) 4 MG TbDL Take 1 tablet (4 mg total) by mouth every 6 (six) hours as needed (nausea). 10 tablet 2/13/2023 -- Erwin Kay III, MD    LIDOcaine (LIDODERM) 5 % Place 1 patch onto the skin once daily. 5 patch 2/13/2023 -- Erwin Kay III, MD          Follow-up Information       Follow up With Specialties Details Why Contact Info    Your primary care provider   Schedule a close ER follow-up visit.     ER   Return to the ER for worsened symptoms or for any other concerns.              Erwin Kay III, MD  03/10/23 4827

## 2024-03-29 ENCOUNTER — HOSPITAL ENCOUNTER (EMERGENCY)
Facility: HOSPITAL | Age: 39
Discharge: HOME | End: 2024-03-29
Attending: EMERGENCY MEDICINE

## 2024-03-29 VITALS
SYSTOLIC BLOOD PRESSURE: 157 MMHG | TEMPERATURE: 99 F | DIASTOLIC BLOOD PRESSURE: 68 MMHG | OXYGEN SATURATION: 97 % | RESPIRATION RATE: 19 BRPM | HEART RATE: 90 BPM

## 2024-03-29 DIAGNOSIS — J06.9 VIRAL URI WITH COUGH: Primary | ICD-10-CM

## 2024-03-29 LAB
GROUP A STREP, MOLECULAR: NEGATIVE
INFLUENZA A, MOLECULAR: NEGATIVE
INFLUENZA B, MOLECULAR: NEGATIVE
SARS-COV-2 RDRP RESP QL NAA+PROBE: NEGATIVE
SPECIMEN SOURCE: NORMAL

## 2024-03-29 PROCEDURE — U0002 COVID-19 LAB TEST NON-CDC: HCPCS

## 2024-03-29 PROCEDURE — 87502 INFLUENZA DNA AMP PROBE: CPT

## 2024-03-29 PROCEDURE — 99284 EMERGENCY DEPT VISIT MOD MDM: CPT

## 2024-03-29 PROCEDURE — 87651 STREP A DNA AMP PROBE: CPT

## 2024-03-29 RX ORDER — BENZONATATE 100 MG/1
100 CAPSULE ORAL 2 TIMES DAILY
Qty: 10 CAPSULE | Refills: 0 | Status: SHIPPED | OUTPATIENT
Start: 2024-03-29 | End: 2024-04-03

## 2024-03-29 RX ORDER — GUAIFENESIN 600 MG/1
1200 TABLET, EXTENDED RELEASE ORAL 2 TIMES DAILY
Qty: 20 TABLET | Refills: 0 | Status: SHIPPED | OUTPATIENT
Start: 2024-03-29 | End: 2024-04-03

## 2024-03-29 RX ORDER — AZELASTINE 1 MG/ML
1 SPRAY, METERED NASAL 2 TIMES DAILY
Qty: 30 ML | Refills: 0 | Status: SHIPPED | OUTPATIENT
Start: 2024-03-29 | End: 2025-03-29

## 2024-03-29 RX ORDER — CETIRIZINE HYDROCHLORIDE 10 MG/1
10 TABLET ORAL DAILY
Qty: 14 TABLET | Refills: 0 | Status: SHIPPED | OUTPATIENT
Start: 2024-03-29 | End: 2024-04-12

## 2024-03-29 NOTE — Clinical Note
"Sparkle Shepherdlen Chu was seen and treated in our emergency department on 3/29/2024.  She may return to work on 04/01/2024.       If you have any questions or concerns, please don't hesitate to call.      Becky Dalton PA-C"

## 2024-03-30 NOTE — ED NOTES
Pt presents to the ED c/o sinus congestion, cough, and sore throat. Pt states the symptoms has been lingering since Monday and has not gotten better. Pt was tested for the flu on Monday and that result came out to be negative. Pt denies chest pain or SOB. Pt is AA&OX4. Pt is ambulatory. Pt's family member at bedside.

## 2024-03-30 NOTE — DISCHARGE INSTRUCTIONS
Thank you for letting me care for you today - it was nice to meet you and I hope you feel better soon. Please return to the ER if your symptoms don't improve or get worse. And be sure to follow up with your primary care provider within the next week for follow up care. [facility] will call you within 48 hours to make an appointment, or you can call [phone] ([phone]) to schedule.     Rotate between Tylenol and ibuprofen (Motrin), switching every 3 hours. For example, Tylenol at 8am, ibuprofen at 11am, tylenol at 2pm.  Do not take more than 3000 mg of Tylenol in 1 day or more than 2400 mg of ibuprofen and 1 day.     Our goal at [facility] is to always give you outstanding care and exceptional service. You may receive a survey by mail or email in the next week about your experience in our ED. We would greatly appreciate you completing and returning the survey. Your feedback provides us with a way to recognize our staff who give very good care and it helps us learn how to improve when your experience was below our aspiration of excellence.     All the best,     Becky Dalton, MPH, PA-C  Emergency Department Physician Assistant  [facility], [facility]

## 2024-03-31 NOTE — ED PROVIDER NOTES
Encounter Date: 3/29/2024       History     Chief Complaint   Patient presents with    Cough     Cough, congestion, sore throat, headache, chills that started on Tuesday. States son has also been sick with similar symptoms. He tested neg for Flu on Monday.      Patient is a 38 y.o. female who presents with cough, nasal congestion, sore throat, headache, chills, eye itchiness x4 days.  Patient's son is sick with similar symptoms.  Patient has been taking over-the-counter cold and flu medication for symptom relief. Denies fever, chest pain, shortness of breath, wheezing, stridor, drooling, NVD, abdominal pain, constipation, urinary problems, joint problems, rashes, or any other complaints at this time.      The history is provided by the patient.     Review of patient's allergies indicates:   Allergen Reactions    Flexeril [cyclobenzaprine] Anxiety    Tramadol Anxiety     Past Medical History:   Diagnosis Date    Asthma     Bronchitis     Hemorrhoid     Sciatica      Past Surgical History:   Procedure Laterality Date     SECTION       No family history on file.  Social History     Tobacco Use    Smoking status: Never    Smokeless tobacco: Never   Substance Use Topics    Alcohol use: No    Drug use: No     Review of Systems   Constitutional:  Positive for chills. Negative for fever.   HENT:  Positive for congestion, postnasal drip, rhinorrhea and sore throat. Negative for drooling, ear discharge, ear pain, facial swelling, trouble swallowing and voice change.    Eyes:  Positive for itching.   Respiratory:  Positive for cough. Negative for shortness of breath and wheezing.    Cardiovascular:  Negative for chest pain, palpitations and leg swelling.   Gastrointestinal:  Negative for abdominal distention, abdominal pain, diarrhea, nausea and vomiting.   Genitourinary:  Negative for dysuria, frequency, hematuria and urgency.   Musculoskeletal:  Negative for back pain, neck pain and neck stiffness.   Skin:  Negative  for rash.   Neurological:  Positive for headaches. Negative for weakness.   Hematological:  Does not bruise/bleed easily.   All other systems reviewed and are negative.      Physical Exam     Initial Vitals [03/29/24 1940]   BP Pulse Resp Temp SpO2   (!) 157/68 90 19 98.7 °F (37.1 °C) 97 %      MAP       --         Physical Exam    Vitals reviewed.  Constitutional: She appears well-developed and well-nourished.   HENT:   Head: Normocephalic and atraumatic.   Right Ear: Tympanic membrane, external ear and ear canal normal. No mastoid tenderness. Tympanic membrane is not erythematous, not retracted and not bulging.   Left Ear: Tympanic membrane, external ear and ear canal normal. No mastoid tenderness. Tympanic membrane is not erythematous, not retracted and not bulging.   Nose: Rhinorrhea present.   Mouth/Throat: Uvula is midline. No oropharyngeal exudate, posterior oropharyngeal edema or posterior oropharyngeal erythema.   2+ tonsils, bilaterally.  Tonsils symmetrical. Tonsils are erythematous.  There is not tonsillar exudate.  No apparent PTA. Uvula midline.  There is not cervical adenopathy. No Percy's angina.     Eyes: EOM are normal.   Neck:   Normal range of motion.  Cardiovascular:  Normal rate and regular rhythm.           Pulmonary/Chest: Breath sounds normal. No accessory muscle usage or stridor. No respiratory distress. She has no decreased breath sounds. She has no wheezes. She has no rhonchi. She has no rales.   Abdominal: Abdomen is soft. Bowel sounds are normal. She exhibits no distension. There is no abdominal tenderness. There is no rebound and no guarding.   Musculoskeletal:      Cervical back: Normal range of motion.     Lymphadenopathy:     She has no cervical adenopathy.   Neurological: She is alert and oriented to person, place, and time.         ED Course   Procedures  Labs Reviewed   GROUP A STREP, MOLECULAR   INFLUENZA A & B BY MOLECULAR   SARS-COV-2 RNA AMPLIFICATION, QUAL          Imaging  Results    None          Medications - No data to display  Medical Decision Making  Patient is an afebrile, well-appearing 38 y.o. female. VSS. Denies fever, chest pain, SOB, wheezing, difficulty swallowing, neck pain, neck stiffness. Vital signs do not suggest sepsis. Lung sounds are clear and not consistent with pneumonia. There is no neck pain or limited ROM to suggest retropharyngeal abscess or meningitis. Tolerating PO, non-toxic appearing, no hypoxia. The patient remained comfortable and stable during their visit in the ED.  Details of ED course documented in ED workup.     Differential Diagnosis includes, but is not limited to: COVID, influenza, viral URI, bacterial/viral pharyngitis    COVID test negative. Influenza test negative. Strep test negative.    All historical, clinical, and laboratory findings reviewed. Patient with constellation of symptoms most consistent with a viral URI/allergic rhinitis. There are no concerning features on physical exam to suggest an emergent or life threatening condition or an invasive bacterial infection, including, but not limited to: bacterial otitis media/externa, sinusitis, pharyngitis, or peritonsillar abscess. No further intervention is indicated at this time. The patient is at low risk for an emergent/life threatening medical condition at this time, and I am of the belief that that it is safe to discharge the patient from the emergency department.     Patient instructed to follow up with PCP in 2-3 days for recheck of today's complaints. Patient has been counseled regarding the need for follow-up as well as the indications to return to the emergency room should new or worrisome developments. Discharge and follow-up instructions discussed with the patient who expressed understanding and willingness to comply with recommendations. Patient discharged from the emergency department in stable condition, in no acute distress.     Amount and/or Complexity of Data  Reviewed  Labs: ordered. Decision-making details documented in ED Course.    Risk  OTC drugs.  Prescription drug management.               ED Course as of 03/31/24 1401   Fri Mar 29, 2024   2034 SARS-CoV-2 RNA, Amplification, Qual: Negative [OB]   2034 Group A Strep, Molecular: Negative [OB]   2051 Influenza A, Molecular: Negative [OB]   2051 Influenza B, Molecular: Negative [OB]      ED Course User Index  [OB] Becky Dalton PA-C                           Clinical Impression:  Final diagnoses:  [J06.9] Viral URI with cough (Primary)          ED Disposition Condition    Discharge Stable          ED Prescriptions       Medication Sig Dispense Start Date End Date Auth. Provider    cetirizine (ZYRTEC) 10 MG tablet Take 1 tablet (10 mg total) by mouth once daily. for 14 days 14 tablet 3/29/2024 4/12/2024 Becky Dalton PA-C    benzonatate (TESSALON) 100 MG capsule Take 1 capsule (100 mg total) by mouth 2 (two) times a day. for 5 days 10 capsule 3/29/2024 4/3/2024 Becky Dalton PA-C    guaiFENesin (MUCINEX) 600 mg 12 hr tablet Take 2 tablets (1,200 mg total) by mouth 2 (two) times daily. for 5 days 20 tablet 3/29/2024 4/3/2024 Becky Dalton PA-C    azelastine (ASTELIN) 137 mcg (0.1 %) nasal spray 1 spray (137 mcg total) by Nasal route 2 (two) times daily. 30 mL 3/29/2024 3/29/2025 Becky Dalton PA-C          Follow-up Information       Follow up With Specialties Details Why Contact Info Additional Information    [facility]   [address], [address]  [place] [address] [phone] Please park in [address] or [address] and use [address]. Take Medical Office Bldg. elevators.             Becky Dalton PA-C  03/31/24 1401

## 2024-06-23 ENCOUNTER — HOSPITAL ENCOUNTER (EMERGENCY)
Facility: HOSPITAL | Age: 39
Discharge: HOME | End: 2024-06-23
Attending: EMERGENCY MEDICINE

## 2024-06-23 VITALS
OXYGEN SATURATION: 99 % | SYSTOLIC BLOOD PRESSURE: 156 MMHG | HEIGHT: 59 IN | BODY MASS INDEX: 38.71 KG/M2 | TEMPERATURE: 98 F | WEIGHT: 192 LBS | DIASTOLIC BLOOD PRESSURE: 63 MMHG | HEART RATE: 96 BPM | RESPIRATION RATE: 18 BRPM

## 2024-06-23 DIAGNOSIS — S99.921A RIGHT FOOT INJURY: ICD-10-CM

## 2024-06-23 DIAGNOSIS — S99.921A FOOT INJURY, RIGHT, INITIAL ENCOUNTER: ICD-10-CM

## 2024-06-23 LAB
B-HCG UR QL: NEGATIVE
CTP QC/QA: YES

## 2024-06-23 PROCEDURE — 25000003 PHARM REV CODE 250

## 2024-06-23 PROCEDURE — 81025 URINE PREGNANCY TEST: CPT

## 2024-06-23 PROCEDURE — 99283 EMERGENCY DEPT VISIT LOW MDM: CPT | Mod: 25

## 2024-06-23 RX ORDER — HYDROCODONE BITARTRATE AND ACETAMINOPHEN 5; 325 MG/1; MG/1
1 TABLET ORAL EVERY 6 HOURS PRN
Qty: 8 TABLET | Refills: 0 | Status: SHIPPED | OUTPATIENT
Start: 2024-06-23

## 2024-06-23 RX ORDER — ACETAMINOPHEN 325 MG/1
650 TABLET ORAL
Status: COMPLETED | OUTPATIENT
Start: 2024-06-23 | End: 2024-06-23

## 2024-06-23 RX ORDER — IBUPROFEN 800 MG/1
800 TABLET ORAL EVERY 6 HOURS PRN
Qty: 20 TABLET | Refills: 0 | Status: SHIPPED | OUTPATIENT
Start: 2024-06-23

## 2024-06-23 RX ORDER — ACETAMINOPHEN 500 MG
1000 TABLET ORAL EVERY 8 HOURS PRN
Qty: 30 TABLET | Refills: 0 | Status: SHIPPED | OUTPATIENT
Start: 2024-06-23 | End: 2024-07-03

## 2024-06-23 RX ADMIN — ACETAMINOPHEN 650 MG: 325 TABLET ORAL at 02:06

## 2024-06-23 NOTE — DISCHARGE INSTRUCTIONS
The x-rays of your foot and ankle show no broken bones.  Your foot is badly bruised.  I have placed a referral to Podiatry for follow up care.     For pain/fever you can take: Tylenol 1000 mg every 6 hours. Motrin 800 mg every 6 hours. This means you can take medication every three hours. For example, take tylenol at 12pm, motrin at 3pm, tylenol at 6pm, etc.     Do not exceed 3000mg of tylenol in 24 hours. Do not exceed 3200mg of motrin in 24 hours.

## 2024-06-23 NOTE — ED NOTES
Two patient identifiers have been checked and are correct.      Appearance: Pt awake, alert & oriented to person, place & time. Pt in no acute distress at present time. Pt is clean and well groomed with clothes appropriately fastened.   Skin: Skin warm, dry & intact. Color consistent with ethnicity. Mucous membranes moist. No breakdown or brusing noted.   Musculoskeletal: Patient moving upper extremities and LLE well, pt reports limited ROM due to pain to R ankle and foot, no obvious swelling or deformities noted.
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
## 2024-06-28 ENCOUNTER — TELEPHONE (OUTPATIENT)
Dept: PODIATRY | Facility: CLINIC | Age: 39
End: 2024-06-28

## 2024-06-28 NOTE — TELEPHONE ENCOUNTER
Returned Nathalia's call. Gave her the Main number of [phone] and told her to ask for the financial Dept for the [place] location where pt was seen. She verbalized understanding.
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
